# Patient Record
Sex: MALE | Race: WHITE
[De-identification: names, ages, dates, MRNs, and addresses within clinical notes are randomized per-mention and may not be internally consistent; named-entity substitution may affect disease eponyms.]

---

## 2019-12-12 ENCOUNTER — HOSPITAL ENCOUNTER (OUTPATIENT)
Dept: HOSPITAL 75 - CARD | Age: 74
End: 2019-12-12
Attending: PHYSICIAN ASSISTANT
Payer: MEDICARE

## 2019-12-12 DIAGNOSIS — E78.2: ICD-10-CM

## 2019-12-12 DIAGNOSIS — I10: ICD-10-CM

## 2019-12-12 DIAGNOSIS — I65.29: ICD-10-CM

## 2019-12-12 DIAGNOSIS — I27.0: ICD-10-CM

## 2019-12-12 DIAGNOSIS — I51.7: Primary | ICD-10-CM

## 2019-12-12 PROCEDURE — 93306 TTE W/DOPPLER COMPLETE: CPT

## 2019-12-13 ENCOUNTER — HOSPITAL ENCOUNTER (OUTPATIENT)
Dept: HOSPITAL 75 - RAD FS | Age: 74
End: 2019-12-13
Attending: UROLOGY
Payer: MEDICARE

## 2019-12-13 DIAGNOSIS — Z87.442: Primary | ICD-10-CM

## 2019-12-13 PROCEDURE — 74018 RADEX ABDOMEN 1 VIEW: CPT

## 2019-12-13 NOTE — DIAGNOSTIC IMAGING REPORT
INDICATION: History of renal calculi. Follow-up.



COMPARISON: None.



FINDINGS: Two frontal radiographic views of the abdomen were

obtained and demonstrate nondistended loops of small bowel. There

is no large collection of free intraperitoneal air. Multiple

extraosseous rounded calcifications are noted in the right upper

abdominal quadrant, but these appear to reside lateral to the

renal shadow. No unexpected radiopaque foreign bodies are seen.

Osseous structures show no acute abnormalities.



IMPRESSION:

1. Extraosseous calcifications in the right upper abdominal

quadrant, which are felt to be on the basis of debris within the

large or small bowel. Cholelithiasis is also a consideration.

2. No definite renal calculi.

3. Nonobstructive small bowel gas pattern.



Dictated by: 



  Dictated on workstation # [id number]

## 2020-01-13 ENCOUNTER — HOSPITAL ENCOUNTER (OUTPATIENT)
Dept: HOSPITAL 75 - CARD | Age: 75
End: 2020-01-13
Attending: PHYSICIAN ASSISTANT
Payer: MEDICARE

## 2020-01-13 VITALS — DIASTOLIC BLOOD PRESSURE: 70 MMHG | SYSTOLIC BLOOD PRESSURE: 129 MMHG

## 2020-01-13 VITALS — BODY MASS INDEX: 30.1 KG/M2 | HEIGHT: 66.93 IN | WEIGHT: 191.8 LBS

## 2020-01-13 VITALS — DIASTOLIC BLOOD PRESSURE: 71 MMHG | SYSTOLIC BLOOD PRESSURE: 145 MMHG

## 2020-01-13 DIAGNOSIS — I27.0: ICD-10-CM

## 2020-01-13 DIAGNOSIS — I65.23: Primary | ICD-10-CM

## 2020-01-13 DIAGNOSIS — E78.2: ICD-10-CM

## 2020-01-13 PROCEDURE — 78452 HT MUSCLE IMAGE SPECT MULT: CPT

## 2020-01-13 PROCEDURE — 93017 CV STRESS TEST TRACING ONLY: CPT

## 2020-01-13 NOTE — STRESS TEST
DATE OF SERVICE:  01/13/2020



EXERCISE MYOVIEW STRESS TEST REPORT



REFERRING PHYSICIAN:

Dr. Lloyd and Dr. Lama.



Baseline heart rate is 56.  Baseline blood pressure 150/77.  Baseline EKG is

sinus rhythm with no ischemic changes.



In summary, the patient was injected with 10.13 mCi of technetium-99 Myoview and

the resting images were obtained.  Then, the patient started exercising with a

baseline heart rate, blood pressure and EKG mentioned above.  The patient was

able to exercise for a total of 7 minutes 20 seconds on standard Juan protocol.

 With peak exercise level, EKG was showing minimal nondiagnostic changes.  Blood

pressure was 200/79.  During recovery, heart rate and blood pressure returned to

baseline.  EKG returned to baseline.



The resting and stress images were reviewed and compared in the short axis,

horizontal long axis, and vertical long axis views.  Review of the images showed

diaphragmatic attenuation with no significant ischemia or infarction.  SSS is 2,

SDS 2, TID value 1.03.  On the gated images, the left ventricle appeared to be

normal size with normal contractility.  Calculated ejection fraction 63%.



CONCLUSION:

1.  Fair exercise tolerance, a total of 7 minutes 20 seconds on standard Juan

protocol, total of 8.9 METS achieving 93% of maximum expected heart rate.

2.  Severe hypertensive response to exercise with peak blood pressure 200/79

returned to baseline during recovery.

3.  Nondiagnostic EKG changes with exercise returned to baseline during

recovery.

4.  Diaphragmatic attenuation with no significant ischemia or infarction on

SPECT images.

5.  Normal left ventricular size with normal contractility.  Calculated ejection

fraction 63%.





Job ID: [id number]

DocumentID: [id number]

Dictated Date:  01/13/2020 12:33:34

Transcription Date: 01/13/2020 17:01:28

Dictated By: MARGIE SUMMERS MD

## 2020-12-11 ENCOUNTER — HOSPITAL ENCOUNTER (OUTPATIENT)
Dept: HOSPITAL 75 - RAD FS | Age: 75
End: 2020-12-11
Attending: UROLOGY
Payer: MEDICARE

## 2020-12-11 DIAGNOSIS — Z87.442: ICD-10-CM

## 2020-12-11 DIAGNOSIS — N28.89: Primary | ICD-10-CM

## 2020-12-11 PROCEDURE — 74018 RADEX ABDOMEN 1 VIEW: CPT

## 2020-12-11 NOTE — DIAGNOSTIC IMAGING REPORT
INDICATION: HX OF KIDNEY STONES.



TECHNIQUE: 2 supine views of the abdomen 10:12 AM



CORRELATION STUDY: 12/13/2019



FINDINGS:

Multiple calcifications of the right upper quadrant are again

demonstrated. Given the persistent nature, may be reflective of

perhaps gallstones or, less likely, granulomas within the liver.

No definitive calcification in unexpected location of the right

renal silhouette. There are small calcifications over the

inferior pole of the left kidney, 4 mm in size.  Overlying bowel

gas pattern appears nonobstructed.



IMPRESSION:

1. Probable small stone over the inferior pole of the left

kidney.

2. Calcifications in the right upper quadrant are again

demonstrated. Given persistent nature, favors perhaps gallstones

or perhaps hepatic granulomas. One of these does project over the

superior pole of the right kidney.



Dictated by: 



  Dictated on workstation # [id number]

## 2020-12-23 ENCOUNTER — HOSPITAL ENCOUNTER (OUTPATIENT)
Dept: HOSPITAL 75 - RAD | Age: 75
End: 2020-12-23
Attending: FAMILY MEDICINE
Payer: MEDICARE

## 2020-12-23 DIAGNOSIS — N63.10: Primary | ICD-10-CM

## 2020-12-23 PROCEDURE — 76642 ULTRASOUND BREAST LIMITED: CPT

## 2020-12-23 PROCEDURE — 77065 DX MAMMO INCL CAD UNI: CPT

## 2020-12-23 NOTE — DIAGNOSTIC IMAGING REPORT
INDICATION: 

Palpable lump right breast.



COMPARISON:   

02/17/2014.



TECHNIQUE: 

Unilateral right 2D and 3D diagnostic mammography was performed

with CAD.



FINDINGS:

There is a moderate amount of fibroglandular tissue in the

retroareolar right breast, consistent with gynecomastia. No

discrete mass is seen. There is milder gynecomastia in the

retroareolar left breast which was obtained for comparison

purposes. No suspicious microcalcifications are seen.



IMPRESSION: 

Probable gynecomastia in the retroareolar right breast. Even so,

an ultrasound of this area is recommended and will be performed

today.



ACR BI-RADS Category 0: Incomplete. (Needs additional imaging

evaluation).

Result letter will be mailed to the patient.

Note: At least 10% of breast cancer is not imaged by mammography.



Dictated by: 



  Dictated on workstation # [id number]

## 2020-12-23 NOTE — DIAGNOSTIC IMAGING REPORT
INDICATION: 

Right breast lump.



COMPARISON: 

Correlation is made with the diagnostic mammogram from earlier

this same day.



FINDINGS:

Sonographic interrogation of the retroareolar right breast was

performed. There is an ill-defined area of hypoechogenicity, most

consistent with gynecomastia. No discrete mass is seen.



IMPRESSION: 

Findings are most suggestive of gynecomastia.



ACR BI-RADS Category 2: Benign findings.

Result letter will be mailed to the patient.

Note: At least 10% of breast cancer is not imaged by mammography.



Dictated by: 



  Dictated on workstation # [id number]

## 2021-06-11 ENCOUNTER — HOSPITAL ENCOUNTER (OUTPATIENT)
Dept: HOSPITAL 75 - RAD FS | Age: 76
End: 2021-06-11
Attending: UROLOGY
Payer: MEDICARE

## 2021-06-11 DIAGNOSIS — N28.89: Primary | ICD-10-CM

## 2021-06-11 PROCEDURE — 74018 RADEX ABDOMEN 1 VIEW: CPT

## 2021-06-11 NOTE — DIAGNOSTIC IMAGING REPORT
INDICATION: Follow-up of nephrolithiasis.



TECHNIQUE: Single supine view of the abdomen 11:05 AM



CORRELATION STUDY: 12/11/2020.



FINDINGS: Large amount of overlying bowel gas and stool obscures

detail. Multiple calcifications of the right upper quadrant are

again demonstrated. Overall generally stabilized position. These

are somewhat indeterminate but appear to be largely outside the

renal silhouette and could be perhaps gallstones or additional

soft tissue calcifications. The previously noted small

calcification in the inferior pole of left kidney is not

visualized at follow-up. Mild loss of height, particularly the L1

vertebral body. Advanced degenerative changes of bilateral hips.



IMPRESSION:

1. Calcifications of the right upper quadrant overall appear

unchanged. Likely outside the renal silhouette could be

reflective of gallstones or alternative areas of calcification.

2. Previously noted calcifications over the inferior pole of left

kidney not visualized at follow-up.



Dictated by: 



  Dictated on workstation # [id number]

## 2022-02-11 ENCOUNTER — HOSPITAL ENCOUNTER (OUTPATIENT)
Dept: HOSPITAL 75 - RAD FS | Age: 77
End: 2022-02-11
Attending: UROLOGY
Payer: MEDICARE

## 2022-02-11 DIAGNOSIS — N20.0: Primary | ICD-10-CM

## 2022-02-11 PROCEDURE — 74018 RADEX ABDOMEN 1 VIEW: CPT

## 2022-02-11 NOTE — DIAGNOSTIC IMAGING REPORT
INDICATION: Follow up kidney stones.



TIME OF EXAM: 11:20 AM.



COMPARISON: Correlation is made with prior abdominal radiograph

from 06/11/2021.



Right upper quadrant calcifications appear to be similar to prior

exam. The more medially located calcific density appears to

overlie the renal shadow and may represent a renal calculus. The

more laterally located calcifications are indeterminate but could

potentially be in the gallbladder. No left-sided urinary tract

calculi are seen. The bowel gas pattern is unremarkable.



IMPRESSION: Stable KUB when compared to exam from 06/11/2021. 



Dictated by: 



  Dictated on workstation # [id number]

## 2022-04-13 ENCOUNTER — HOSPITAL ENCOUNTER (OUTPATIENT)
Dept: HOSPITAL 75 - SLEEP | Age: 77
LOS: 1 days | Discharge: HOME | End: 2022-04-14
Attending: OTOLARYNGOLOGY
Payer: MEDICARE

## 2022-04-13 DIAGNOSIS — G47.33: Primary | ICD-10-CM

## 2022-04-13 DIAGNOSIS — G47.31: ICD-10-CM

## 2022-04-13 PROCEDURE — 95811 POLYSOM 6/>YRS CPAP 4/> PARM: CPT

## 2022-05-23 ENCOUNTER — HOSPITAL ENCOUNTER (OUTPATIENT)
Dept: HOSPITAL 75 - RT | Age: 77
End: 2022-05-23
Attending: NURSE PRACTITIONER
Payer: MEDICARE

## 2022-05-23 DIAGNOSIS — R05.9: Primary | ICD-10-CM

## 2022-05-23 DIAGNOSIS — R06.02: ICD-10-CM

## 2022-05-23 DIAGNOSIS — R06.00: ICD-10-CM

## 2022-05-23 PROCEDURE — 94726 PLETHYSMOGRAPHY LUNG VOLUMES: CPT

## 2022-05-23 PROCEDURE — 71046 X-RAY EXAM CHEST 2 VIEWS: CPT

## 2022-05-23 PROCEDURE — 94729 DIFFUSING CAPACITY: CPT

## 2022-05-23 PROCEDURE — 94060 EVALUATION OF WHEEZING: CPT

## 2022-05-23 NOTE — DIAGNOSTIC IMAGING REPORT
Indication: Cough and shortness of breath



PA and lateral chest



Heart size and pulmonary vascularity are normal. Lungs are clear.

There are no effusions or pneumothoraces.



IMPRESSION: No acute abnormalities in the chest



Dictated by: 



  Dictated on workstation # [id number]

## 2022-08-05 ENCOUNTER — HOSPITAL ENCOUNTER (OUTPATIENT)
Dept: HOSPITAL 75 - RAD FS | Age: 77
End: 2022-08-05
Attending: OTOLARYNGOLOGY
Payer: MEDICARE

## 2022-08-05 DIAGNOSIS — J32.9: Primary | ICD-10-CM

## 2022-08-05 PROCEDURE — 70486 CT MAXILLOFACIAL W/O DYE: CPT

## 2022-08-05 NOTE — DIAGNOSTIC IMAGING REPORT
PROCEDURE: CT sinuses without contrast



TECHNIQUE: Multiple contiguous axial images were obtained through

the sinuses without the use of intravenous contrast. Coronal and

sagittal reformations were then performed. Auto Exposure Controls

were utilized during the CT exam to meet ALARA standards for

radiation dose reduction.



INDICATION: Chronic sinusitis.



FINDINGS: There is some mucosal thickening of the frontal sinus.

There is opacification of multiple bilateral ethmoid air cells.

Minimal mucosal disease of the sphenoid sinus is seen. Left

maxillary sinus is hypoplastic. There is some minimal mucosal

thickening of bilateral maxillary sinuses. No air-fluid levels

are seen. Mastoids are well aerated. There is thickening in the

ostiomeatal complexes bilaterally. Nasal septum is midline. There

is a lorenzo bullosa on the right.



IMPRESSION: Paranasal sinus mucosal disease. No air-fluid levels

are identified.



Dictated by: 



  Dictated on workstation # [id number]

## 2022-11-25 ENCOUNTER — HOSPITAL ENCOUNTER (OUTPATIENT)
Dept: HOSPITAL 75 - LAB FS | Age: 77
End: 2022-11-25
Attending: FAMILY MEDICINE
Payer: MEDICARE

## 2022-11-25 DIAGNOSIS — K80.20: ICD-10-CM

## 2022-11-25 DIAGNOSIS — I25.10: Primary | ICD-10-CM

## 2022-11-25 DIAGNOSIS — R05.3: ICD-10-CM

## 2022-11-25 DIAGNOSIS — N62: ICD-10-CM

## 2022-11-25 LAB
BUN/CREAT SERPL: 7
CREAT SERPL-MCNC: 0.97 MG/DL (ref 0.6–1.3)
GFR SERPLBLD BASED ON 1.73 SQ M-ARVRAT: 80 ML/MIN

## 2022-11-25 PROCEDURE — 71260 CT THORAX DX C+: CPT

## 2022-11-25 PROCEDURE — 84520 ASSAY OF UREA NITROGEN: CPT

## 2022-11-25 PROCEDURE — 82565 ASSAY OF CREATININE: CPT

## 2022-11-25 PROCEDURE — 36415 COLL VENOUS BLD VENIPUNCTURE: CPT

## 2022-11-25 NOTE — DIAGNOSTIC IMAGING REPORT
PROCEDURE: CT chest with contrast only.



TECHNIQUE: Multiple contiguous axial images were obtained through

the chest after administration of intravenous contrast. Auto

Exposure Controls were utilized during the CT exam to meet ALARA

standards for radiation dose reduction. 



INDICATION: Chronic cough. 



COMPARISON: Chest radiograph 05/23/2022.



FINDINGS: Mild biapical scarring. Lungs are otherwise clear. No

pleural effusion or pneumothorax. No endobronchial lesions. No

pleural effusion or pneumothorax. Normal heart size. No

pericardial effusion. No lymphadenopathy. Advanced coronary

artery calcifications. Chronic left clavicle fracture.

Compression fracture of T12 resulting in approximately 50% height

loss is stable compared to 05/23/2022. Gynecomastia on the right.

Cholelithiasis without secondary findings of cholecystitis.

Nonobstructing renal stones in the partially visualized kidneys

bilaterally.



IMPRESSION: 

1. No acute CT findings in the chest. Mild biapical scarring.

2. Cholelithiasis without cholecystitis.

3. Advanced coronary artery calcifications. 

4. Chronic fractures of the left clavicle and T12. No acute

osseous findings.

5. Gynecomastia.



Dictated by: 



  Dictated on workstation # [id number]

## 2023-02-16 ENCOUNTER — HOSPITAL ENCOUNTER (OUTPATIENT)
Dept: HOSPITAL 75 - LAB FS | Age: 78
End: 2023-02-16
Attending: OTOLARYNGOLOGY
Payer: MEDICARE

## 2023-02-16 DIAGNOSIS — E03.9: Primary | ICD-10-CM

## 2023-02-16 PROCEDURE — 36415 COLL VENOUS BLD VENIPUNCTURE: CPT

## 2023-02-16 PROCEDURE — 84443 ASSAY THYROID STIM HORMONE: CPT

## 2023-03-10 ENCOUNTER — HOSPITAL ENCOUNTER (OUTPATIENT)
Dept: HOSPITAL 75 - CARD | Age: 78
End: 2023-03-10
Attending: FAMILY MEDICINE
Payer: MEDICARE

## 2023-03-10 DIAGNOSIS — I10: Primary | ICD-10-CM

## 2023-03-10 DIAGNOSIS — I25.10: ICD-10-CM

## 2023-03-10 PROCEDURE — 93306 TTE W/DOPPLER COMPLETE: CPT

## 2023-04-27 ENCOUNTER — HOSPITAL ENCOUNTER (EMERGENCY)
Dept: HOSPITAL 75 - ER FS | Age: 78
Discharge: HOME | End: 2023-04-27
Payer: MEDICARE

## 2023-04-27 VITALS — BODY MASS INDEX: 28.55 KG/M2 | WEIGHT: 181.88 LBS | HEIGHT: 66.93 IN

## 2023-04-27 VITALS — SYSTOLIC BLOOD PRESSURE: 110 MMHG | DIASTOLIC BLOOD PRESSURE: 69 MMHG

## 2023-04-27 DIAGNOSIS — Z20.822: ICD-10-CM

## 2023-04-27 DIAGNOSIS — K80.10: Primary | ICD-10-CM

## 2023-04-27 DIAGNOSIS — I10: ICD-10-CM

## 2023-04-27 LAB
ALBUMIN SERPL-MCNC: 4.1 GM/DL (ref 3.2–4.5)
ALP SERPL-CCNC: 124 U/L (ref 40–136)
ALT SERPL-CCNC: 13 U/L (ref 0–55)
APTT PPP: YELLOW S
BACTERIA #/AREA URNS HPF: NEGATIVE /HPF
BASOPHILS # BLD AUTO: 0 10^3/UL (ref 0–0.1)
BASOPHILS NFR BLD AUTO: 0 % (ref 0–10)
BASOPHILS NFR BLD MANUAL: 0 %
BILIRUB SERPL-MCNC: 0.4 MG/DL (ref 0.1–1)
BILIRUB UR QL STRIP: NEGATIVE
BUN/CREAT SERPL: 8
CALCIUM SERPL-MCNC: 9 MG/DL (ref 8.5–10.1)
CHLORIDE SERPL-SCNC: 100 MMOL/L (ref 98–107)
CO2 SERPL-SCNC: 24 MMOL/L (ref 21–32)
CREAT SERPL-MCNC: 0.98 MG/DL (ref 0.6–1.3)
EOSINOPHIL # BLD AUTO: 0.4 10^3/UL (ref 0–0.3)
EOSINOPHIL NFR BLD AUTO: 2 % (ref 0–10)
EOSINOPHIL NFR BLD MANUAL: 3 %
FIBRINOGEN PPP-MCNC: CLEAR MG/DL
GFR SERPLBLD BASED ON 1.73 SQ M-ARVRAT: 79 ML/MIN
GLUCOSE SERPL-MCNC: 135 MG/DL (ref 70–105)
GLUCOSE UR STRIP-MCNC: NEGATIVE MG/DL
HCT VFR BLD CALC: 39 % (ref 40–54)
HGB BLD-MCNC: 13.7 G/DL (ref 13.3–17.7)
KETONES UR QL STRIP: NEGATIVE
LEUKOCYTE ESTERASE UR QL STRIP: NEGATIVE
LIPASE SERPL-CCNC: 18 U/L (ref 8–78)
LYMPHOCYTES # BLD AUTO: 1.7 10^3/UL (ref 1–4)
LYMPHOCYTES NFR BLD AUTO: 10 % (ref 12–44)
MANUAL DIFFERENTIAL PERFORMED BLD QL: YES
MCH RBC QN AUTO: 33 PG (ref 25–34)
MCHC RBC AUTO-ENTMCNC: 35 G/DL (ref 32–36)
MCV RBC AUTO: 94 FL (ref 80–99)
MONOCYTES # BLD AUTO: 0.5 10^3/UL (ref 0–1)
MONOCYTES NFR BLD AUTO: 3 % (ref 0–12)
MONOCYTES NFR BLD: 3 %
NEUTROPHILS # BLD AUTO: 13.2 10^3/UL (ref 1.8–7.8)
NEUTROPHILS NFR BLD AUTO: 84 % (ref 42–75)
NEUTS BAND NFR BLD MANUAL: 75 %
NEUTS BAND NFR BLD: 2 %
NITRITE UR QL STRIP: NEGATIVE
PH UR STRIP: 6.5 [PH] (ref 5–9)
PLATELET # BLD: 253 10^3/UL (ref 130–400)
PMV BLD AUTO: 8.5 FL (ref 9–12.2)
POTASSIUM SERPL-SCNC: 4 MMOL/L (ref 3.6–5)
PROT SERPL-MCNC: 7.1 GM/DL (ref 6.4–8.2)
PROT UR QL STRIP: NEGATIVE
RBC #/AREA URNS HPF: (no result) /HPF
SODIUM SERPL-SCNC: 134 MMOL/L (ref 135–145)
SP GR UR STRIP: 1.02 (ref 1.02–1.02)
SQUAMOUS #/AREA URNS HPF: (no result) /HPF
VARIANT LYMPHS NFR BLD MANUAL: 17 %
WBC # BLD AUTO: 15.8 10^3/UL (ref 4.3–11)
WBC #/AREA URNS HPF: (no result) /HPF

## 2023-04-27 PROCEDURE — 80053 COMPREHEN METABOLIC PANEL: CPT

## 2023-04-27 PROCEDURE — 74177 CT ABD & PELVIS W/CONTRAST: CPT

## 2023-04-27 PROCEDURE — 85007 BL SMEAR W/DIFF WBC COUNT: CPT

## 2023-04-27 PROCEDURE — 81000 URINALYSIS NONAUTO W/SCOPE: CPT

## 2023-04-27 PROCEDURE — 36415 COLL VENOUS BLD VENIPUNCTURE: CPT

## 2023-04-27 PROCEDURE — 83690 ASSAY OF LIPASE: CPT

## 2023-04-27 PROCEDURE — 87636 SARSCOV2 & INF A&B AMP PRB: CPT

## 2023-04-27 PROCEDURE — 85027 COMPLETE CBC AUTOMATED: CPT

## 2023-04-27 RX ADMIN — Medication PRN ML: at 21:42

## 2023-04-27 RX ADMIN — Medication PRN ML: at 21:43

## 2023-04-27 NOTE — ED ABDOMINAL PAIN
General


Chief Complaint:  Abdominal/GI Problems


Stated Complaint:  CONGESTION,ABD PAIN,HIGH BP


Source of Information:  Patient





History of Present Illness


Date Seen by Provider:  2023


Time Seen by Provider:  20:31


Initial Comments


77-year-old male presenting with complaints of abdominal discomfort that started

at 1400 today.  He denies doing anything to cause the pain to start.  He cannot 

be more specific about what the pain is when asked if it was sharp cramping dull

aching pressure or to describe what the pain was he started describing that he 

area of his abdomen concerning but again would never give any details about the 

type of pain.  Even with prompting he still would never answer the type of pain.

 He was asked if he had pain like this before and he said never for this long 

but then asked if he had the pain in the past but it usually went away he said 

no.  He denies having headache or change in his vision, no chest pain no 

shortness of breath.  He states that the pain is in the middle of his belly and 

does not radiate into his chest or into his back.  He states that it is 

approximately a 5 inch diameter.  He has had some mild nausea but no vomiting.  

He stated eating and drinking did not make the pain any different.  Movement and

palpation does not make any difference on the pain.  He denies having any pain 

or burning with urination.  He has no complaints of diarrhea. He denies fever or

chills.


Timing/Duration:  4-6 Hours


Severity/Quality:  Moderate, Other (patient refuses to describe the pain and 

just says its a "discomfort")


Location:  Epigastric, Periumbilical


Radiation:  No Radiation


Activities at Onset:  None


Associated Symptoms:  Back Pain (chronic back pain); No Chest Pain, No 

Diaphoresis, No Fever/Chills, No Fatigue, No Headache, No Heartburn, No Rash, No

Shortness of Air, No Swelling/Mass in Abdomen, No Syncope, No Weakness





Allergies and Home Medications


Allergies


Coded Allergies:  


     naproxen (Unverified  Allergy, Unknown, 20)





Patient Home Medication List


Home Medication List Reviewed:  Yes


Aspirin (Barron Aspirin) 81 Mg Tablet.dr 81 MG PO DAILY, (Reported)


   Entered as Reported by: BRITT SOTO on 13 1244


Citalopram Hydrobromide (Celexa) 40 Mg Tablet, 1 EACH PO DAILY, (Reported)


   Entered as Reported by: BRITT SOTO on 13 124


Cyanocobalamin (Vitamin B12) 1,000 Mcg Tablet.sa, 1,000 MCG PO, (Reported)


   Entered as Reported by: BRITT SOTO on 13


Multivitamin (Multi-Vitamin Daily) 1 Each Tablet, 1 EACH PO, (Reported)


   Entered as Reported by: BRITT SOTO on 13 124


Olmesartan Medoxomil (Benicar) 5 Mg Tablet, 1 EACH PO 2 tabs daily, (Reported)


   Entered as Reported by: BRITT SOTO on 13 124


Omeprazole (Omeprazole) 40 Mg Capsule.dr, 40 MG PO 2 tabs daily, (Reported)


   Entered as Reported by: BRITT SOTO on 13


Pravastatin Sodium (Pravachol) 40 Mg Tablet, 40 MG PO DAILY, (Reported)


   Entered as Reported by: BRITT SOTO on 13


Vitamin E Acid Succinate (Vitamin E) 100 Unit Tablet, 100 UNIT PO, (Reported)


   Entered as Reported by: BRITT SOTO on 13





Review of Systems


Review of Systems


Constitutional:  No chills, No fever


EENTM:  No Symptoms Reported


Respiratory:  No Symptoms Reported


Cardiovascular:  No Symptoms Reported


Gastrointestinal:  See HPI


Genitourinary:  Denies Flank Pain, Denies Pain


Musculoskeletal:  see HPI


Skin:  No change in color


Psychiatric/Neurological:  No Symptoms Reported





Past Medical-Social-Family Hx


Past Medical History


Surgery/Hospitalization HX:  


Hypertension





Physical Exam


Vital Signs





Vital Signs - First Documented








 23





 20:37


 


Temp 36.2


 


Pulse 54


 


Resp 18


 


B/P (MAP) 197/74 (115)


 


Pulse Ox 95


 


O2 Delivery Room Air





Capillary Refill :


Height/Weight/BMI


Height: '"


Weight: lbs. oz. kg; 30.10 BMI


Method:


General Appearance:  WD/WN, no apparent distress


Respiratory:  chest non-tender, lungs clear, normal breath sounds, no 

respiratory distress, no accessory muscle use


Cardiovascular:  normal peripheral pulses, regular rate, rhythm


Gastrointestinal:  normal bowel sounds, non tender, soft, no pulsatile mass


Extremities:  normal range of motion, normal capillary refill


Neurologic/Psychiatric:  alert


Skin:  normal color, warm/dry





Progress/Results/Core Measures


Results/Orders


Lab Results





Laboratory Tests








Test


 23


20:28 23


20:38 23


21:20 Range/Units


 


 


Urine Color YELLOW     


 


Urine Clarity CLEAR     


 


Urine pH 6.5    5-9  


 


Urine Specific Gravity 1.020    1.016-1.022  


 


Urine Protein NEGATIVE    NEGATIVE  


 


Urine Glucose (UA) NEGATIVE    NEGATIVE  


 


Urine Ketones NEGATIVE    NEGATIVE  


 


Urine Nitrite NEGATIVE    NEGATIVE  


 


Urine Bilirubin NEGATIVE    NEGATIVE  


 


Urine Urobilinogen 0.2    < = 1.0  MG/DL


 


Urine Leukocyte Esterase NEGATIVE    NEGATIVE  


 


Urine RBC (Auto) NEGATIVE    NEGATIVE  


 


Urine RBC NONE     /HPF


 


Urine WBC RARE     /HPF


 


Urine Squamous Epithelial


Cells RARE 


 


 


  /HPF





 


Urine Crystals NONE     /LPF


 


Urine Bacteria NEGATIVE     /HPF


 


Urine Casts NONE     /LPF


 


Urine Mucus NEGATIVE     /LPF


 


Urine Culture Indicated NO     


 


White Blood Count


 


 15.8 H


 


 4.3-11.0


10^3/uL


 


Red Blood Count


 


 4.16 L


 


 4.30-5.52


10^6/uL


 


Hemoglobin  13.7   13.3-17.7  g/dL


 


Hematocrit  39 L  40-54  %


 


Mean Corpuscular Volume  94   80-99  fL


 


Mean Corpuscular Hemoglobin  33   25-34  pg


 


Mean Corpuscular Hemoglobin


Concent 


 35 


 


 32-36  g/dL





 


Red Cell Distribution Width  12.1   10.0-14.5  %


 


Platelet Count


 


 253 


 


 130-400


10^3/uL


 


Mean Platelet Volume  8.5 L  9.0-12.2  fL


 


Immature Granulocyte % (Auto)  0    %


 


Neutrophils (%) (Auto)  84 H  42-75  %


 


Lymphocytes (%) (Auto)  10 L  12-44  %


 


Monocytes (%) (Auto)  3   0-12  %


 


Eosinophils (%) (Auto)  2   0-10  %


 


Basophils (%) (Auto)  0   0-10  %


 


Neutrophils # (Auto)


 


 13.2 H


 


 1.8-7.8


10^3/uL


 


Lymphocytes # (Auto)


 


 1.7 


 


 1.0-4.0


10^3/uL


 


Monocytes # (Auto)


 


 0.5 


 


 0.0-1.0


10^3/uL


 


Eosinophils # (Auto)


 


 0.4 H


 


 0.0-0.3


10^3/uL


 


Basophils # (Auto)


 


 0.0 


 


 0.0-0.1


10^3/uL


 


Immature Granulocyte # (Auto)


 


 0.1 


 


 0.0-0.1


10^3/uL


 


Neutrophils % (Manual)  75    %


 


Lymphocytes % (Manual)  17    %


 


Monocytes % (Manual)  3    %


 


Eosinophils % (Manual)  3    %


 


Basophils % (Manual)  0    %


 


Band Neutrophils  2    %


 


Sodium Level  134 L  135-145  MMOL/L


 


Potassium Level  4.0   3.6-5.0  MMOL/L


 


Chloride Level  100     MMOL/L


 


Carbon Dioxide Level  24   21-32  MMOL/L


 


Anion Gap  10   5-14  MMOL/L


 


Blood Urea Nitrogen  8   7-18  MG/DL


 


Creatinine


 


 0.98 


 


 0.60-1.30


MG/DL


 


Estimat Glomerular Filtration


Rate 


 79 


 


  





 


BUN/Creatinine Ratio  8    


 


Glucose Level  135 H    MG/DL


 


Calcium Level  9.0   8.5-10.1  MG/DL


 


Corrected Calcium  8.9   8.5-10.1  MG/DL


 


Total Bilirubin  0.4   0.1-1.0  MG/DL


 


Aspartate Amino Transf


(AST/SGOT) 


 20 


 


 5-34  U/L





 


Alanine Aminotransferase


(ALT/SGPT) 


 13 


 


 0-55  U/L





 


Alkaline Phosphatase  124     U/L


 


Total Protein  7.1   6.4-8.2  GM/DL


 


Albumin  4.1   3.2-4.5  GM/DL


 


Lipase  18   8-78  U/L


 


Influenza Type A (RT-PCR)   Not Detected  Not Detecte  


 


Influenza Type B (RT-PCR)   Not Detected  Not Detecte  


 


SARS-CoV-2 RNA (RT-PCR)   Not Detected  Not Detecte  








My Orders





Orders - AMRITA FRIAS MD


Comprehensive Metabolic Panel (23 20:29)


Lipase (23 20:29)


Ua Culture If Indicated (23 20:29)


Ed Iv/Invasive Line Start (23 20:29)


Cbc With Automated Diff (23 20:29)


Covid 19 Inhouse Test (23 20:29)


Influenza A And B By Pcr (23 20:29)


Ct Abdomen/Pelvis W (23 20:44)


Hydralazine Injection (Apresoline Inject (23 20:44)


Pantoprazole Injection (Protonix Injecti (23 20:44)


Ketorolac Injection (Toradol Injection) (23 20:44)


Manual Differential (23 20:38)


Iohexol Injection (Omnipaque 350 Mg/Ml 1 (23 21:15)


Received Contrast (Hold Metformin- Contr (23 21:15)


Sodium Chloride Flush (Catheter Flush Sy (23 21:15)


Ns (Ivpb) (Sodium Chloride 0.9% Ivpb Bag (23 21:15)





Medications Given in ED





Current Medications








 Medications  Dose


 Ordered  Sig/Kathy


 Route  Start Time


 Stop Time Status Last Admin


Dose Admin


 


 Iohexol  80 ml  ONCE  ONCE


 IV  23 21:15


 23 21:16 DC 23 21:42


80 ML


 


 Sodium Chloride  10 ml  AS NEEDED  PRN


 IV  23 21:15


 23 22:36 DC 23 21:43


10 ML


 


 Sodium Chloride  100 ml  ONCE  ONCE


 IV  23 21:15


 23 21:16 DC 23 21:42


100 ML








Vital Signs/I&O











 23





 20:37 22:25


 


Temp 36.2 36.8


 


Pulse 54 79


 


Resp 18 20


 


B/P (MAP) 197/74 (115) 110/69


 


Pulse Ox 95 96


 


O2 Delivery Room Air Room Air











Progress


Progress Note #1:  


Progress Note


Potential diagnosis of gastritis, constipation, colitis, diverticulitis, 

ischemic colitis, peptic ulcer disease.





Obtain peripheral IV access and send labs for complete blood count, 

comprehensive metabolic profile, lipase, urinalysis.  Obtain a CT scan of the 

abdomen and pelvis with IV contrast looking for signs of ischemic bowel, 

diverticulitis, colitis, constipation.  Normal saline 1 L IV fluid bolus for 

hydration, Protonix 40 mg IV for possible gastritis, Toradol 15 mg IV for 

abdominal pain and inflammation.


Progress Note #2:  


Progress Note


 complete blood count shows elevated white blood cell count of 15.8 

thousand.  Hemoglobin was not showing anemia as it was at 13.7.  There was a 

slight left shift with 75% neutrophils, 17 lymphocytes, 2% bands.  Comprehensive

metabolic profile did not show any acute significant abnormality to account for 

his abdominal discomfort.  His creatinine was normal at 0.98.  Glucose was 

slightly elevated at 135.  Lipase was normal at 18.  Liver enzymes were not 

elevated.  Urinalysis showed specific gravity of 1.020.  There is no nitrites, 

leukocyte esterase, bacteria to indicate UTI.  After hydralazine blood pressure 

was down to 150/73.


Progress Note #3:  


   Time:  22:04


Progress Note


220 CT scanning of the abdomen and pelvis read by the radiologist shows 

findings concerning for acute cholecystitis with a 7 mm stone in the cystic 

duct.  There is gallbladder wall thickening and mild pericholecystic fluid.  His

liver enzymes are not elevated and his pain is now down to a 0.  


I called and spoke with the on-call surgeon, Dr. Hart.  After reviewing the 

patient's presentation and his elevated white blood cell count with normal LFTs 

and findings on CT concerning for a gallstone in the cystic duct and some 

inflammation of the gallbladder he advised if the patient was having continued 

pain he could be admitted for pain control and plan on surgery tomorrow.  

Otherwise he could go home and have nothing to eat or drink after midnight and 

then call the office in the morning and he could be seen or they could arrange 

for him to have surgery tomorrow with he was still having pain and concerns 

otherwise it could be scheduled next week sometime or when it was convenient for

him.  If his pain worsens or return nature he has uncontrolled nausea and 

vomiting then he should be seen again as he may need to stay overnight for 

having pain control and surgery.





When reviewing options with the patient he stated since his pain was gone now he

would like to go home.  They will call the office in the morning and see about 

follow-up and when to get surgery done.





Diagnostic Imaging





   Diagonstic Imaging:  CT


   Plain Films/CT/US/NM/MRI:  abdomen, pelvis


Comments


                 [facility] [place], [place]





NAME:   ERNIE GUERRERO JOSE


[facility] REC#:   [id number]


ACCOUNT#:   [id number]


PT STATUS:   REG ER


:   1945


PHYSICIAN:   AMRITA FRIAS MD


ADMIT DATE:   23/ER FS


                                  ***Signed***


Date of Exam:23





CT ABDOMEN/PELVIS W








PROCEDURE: CT abdomen and pelvis with contrast.





TECHNIQUE: Multiple contiguous axial images were obtained through


the abdomen and pelvis after administration of intravenous


contrast. Auto Exposure Controls were utilized during the CT exam


to meet ALARA standards for radiation dose reduction. All CT


scans use one or more of the following dose optimizing


techniques: automated exposure control, MA and/or KvP adjustment


based on patient size and exam type or iterative reconstruction.





INDICATION: Epigastric and periumbilical pain.





FINDINGS: There is mild linear atelectasis and/or scarring in the


lung bases. No focal hepatic, pancreatic, adrenal gland, splenic


or renal abnormality is identified. There are multiple stones


present within the gallbladder including in the neck. Additional


calculus of approximately 0.7 cm in diameter appears to reside


within the cystic duct. There is gallbladder wall thickening and


pericholecystic fluid. Mild intrahepatic biliary ductal


dilatation is also suspected. Stomach appears mildly thickened


although this could be due to incomplete distention. There is no


evidence of free fluid within the abdomen or pelvis. Numerous


sigmoid diverticula are present without associated inflammation.


Bladder is incompletely distended which limits evaluation. There


are surgical findings at the prostate gland. There is mild


compression deformity of T12 vertebral body without hematoma


suggesting its a chronic finding.





IMPRESSION:


1. Findings are suggestive of acute cholecystitis likely due to


cystic duct obstruction. There may be secondary biliary ductal


dilatation as well. This could be related to previous stone


passage.


2. There is also gastric mural thickening suggestive of possible


gastritis which could be in the setting of peptic ulcer disease. 





Dictated by: 





  Dictated on workstation # [id number]








Dict:   23


Trans:   23


[facility] [id number]





Interpreted by:     YESI GUTIERREZ MD


Electronically signed by: YESI GUTIERREZ MD 23


   Reviewed:  Reviewed by Me





Departure


Impression





   Primary Impression:  


   Cholecystitis with cholelithiasis


   Qualified Codes:  K80.42 - Calculus of bile duct with acute cholecystitis 

   without obstruction


   Additional Impressions:  


   Periumbilical abdominal pain


   Elevated blood pressure reading with diagnosis of hypertension


Disposition:  01 HOME, SELF-CARE


Condition:  Improved





Departure-Patient Inst.


Decision time for Depature:  22:17


Referrals:  


NOEMI HART PANKAJ K MD (PCP/Family)


Primary Care Physician


Patient Instructions:  Gallstones ED, High Blood Pressure ED, Gallbladder Diet





Add. Discharge Instructions:  


Nothing to eat or drink after midnight and call Dr. Hart in the morning to see

when he wants to have the gallbladder removed. 


If you are still having pain or your symptoms are worsening then they could do 

surgery tomorrow, otherwise he can schedule surgery for you as an outpatient and

have you follow a low fat bland diet until you have surgery. 





All discharge instructions reviewed with patient and/or family. Voiced 

understanding.











AMRITA FRIAS MD               2023 21:00

## 2023-04-27 NOTE — DIAGNOSTIC IMAGING REPORT
PROCEDURE: CT abdomen and pelvis with contrast.



TECHNIQUE: Multiple contiguous axial images were obtained through

the abdomen and pelvis after administration of intravenous

contrast. Auto Exposure Controls were utilized during the CT exam

to meet ALARA standards for radiation dose reduction. All CT

scans use one or more of the following dose optimizing

techniques: automated exposure control, MA and/or KvP adjustment

based on patient size and exam type or iterative reconstruction.



INDICATION: Epigastric and periumbilical pain.



FINDINGS: There is mild linear atelectasis and/or scarring in the

lung bases. No focal hepatic, pancreatic, adrenal gland, splenic

or renal abnormality is identified. There are multiple stones

present within the gallbladder including in the neck. Additional

calculus of approximately 0.7 cm in diameter appears to reside

within the cystic duct. There is gallbladder wall thickening and

pericholecystic fluid. Mild intrahepatic biliary ductal

dilatation is also suspected. Stomach appears mildly thickened

although this could be due to incomplete distention. There is no

evidence of free fluid within the abdomen or pelvis. Numerous

sigmoid diverticula are present without associated inflammation.

Bladder is incompletely distended which limits evaluation. There

are surgical findings at the prostate gland. There is mild

compression deformity of T12 vertebral body without hematoma

suggesting its a chronic finding.



IMPRESSION:

1. Findings are suggestive of acute cholecystitis likely due to

cystic duct obstruction. There may be secondary biliary ductal

dilatation as well. This could be related to previous stone

passage.

2. There is also gastric mural thickening suggestive of possible

gastritis which could be in the setting of peptic ulcer disease. 



Dictated by: 



  Dictated on workstation # [id number]

## 2023-04-28 ENCOUNTER — HOSPITAL ENCOUNTER (OUTPATIENT)
Dept: HOSPITAL 75 - SDC | Age: 78
End: 2023-04-28
Attending: SURGERY
Payer: MEDICARE

## 2023-04-28 VITALS — SYSTOLIC BLOOD PRESSURE: 153 MMHG | DIASTOLIC BLOOD PRESSURE: 79 MMHG

## 2023-04-28 VITALS — BODY MASS INDEX: 28.48 KG/M2 | WEIGHT: 181.44 LBS | HEIGHT: 67.01 IN

## 2023-04-28 VITALS — SYSTOLIC BLOOD PRESSURE: 183 MMHG | DIASTOLIC BLOOD PRESSURE: 84 MMHG

## 2023-04-28 VITALS — DIASTOLIC BLOOD PRESSURE: 86 MMHG | SYSTOLIC BLOOD PRESSURE: 165 MMHG

## 2023-04-28 VITALS — SYSTOLIC BLOOD PRESSURE: 159 MMHG | DIASTOLIC BLOOD PRESSURE: 81 MMHG

## 2023-04-28 VITALS — SYSTOLIC BLOOD PRESSURE: 165 MMHG | DIASTOLIC BLOOD PRESSURE: 86 MMHG

## 2023-04-28 VITALS — SYSTOLIC BLOOD PRESSURE: 170 MMHG | DIASTOLIC BLOOD PRESSURE: 81 MMHG

## 2023-04-28 VITALS — SYSTOLIC BLOOD PRESSURE: 144 MMHG | DIASTOLIC BLOOD PRESSURE: 72 MMHG

## 2023-04-28 VITALS — SYSTOLIC BLOOD PRESSURE: 171 MMHG | DIASTOLIC BLOOD PRESSURE: 89 MMHG

## 2023-04-28 VITALS — SYSTOLIC BLOOD PRESSURE: 185 MMHG | DIASTOLIC BLOOD PRESSURE: 81 MMHG

## 2023-04-28 VITALS — SYSTOLIC BLOOD PRESSURE: 178 MMHG | DIASTOLIC BLOOD PRESSURE: 90 MMHG

## 2023-04-28 VITALS — DIASTOLIC BLOOD PRESSURE: 72 MMHG | SYSTOLIC BLOOD PRESSURE: 144 MMHG

## 2023-04-28 VITALS — DIASTOLIC BLOOD PRESSURE: 86 MMHG | SYSTOLIC BLOOD PRESSURE: 167 MMHG

## 2023-04-28 DIAGNOSIS — E66.9: ICD-10-CM

## 2023-04-28 DIAGNOSIS — Z87.891: ICD-10-CM

## 2023-04-28 DIAGNOSIS — G47.33: ICD-10-CM

## 2023-04-28 DIAGNOSIS — K80.12: Primary | ICD-10-CM

## 2023-04-28 DIAGNOSIS — Z99.81: ICD-10-CM

## 2023-04-28 PROCEDURE — 87081 CULTURE SCREEN ONLY: CPT

## 2023-04-28 PROCEDURE — 76000 FLUOROSCOPY <1 HR PHYS/QHP: CPT

## 2023-04-28 PROCEDURE — 88304 TISSUE EXAM BY PATHOLOGIST: CPT

## 2023-04-28 RX ADMIN — SODIUM CHLORIDE, SODIUM LACTATE, POTASSIUM CHLORIDE, AND CALCIUM CHLORIDE PRN MLS/HR: 600; 310; 30; 20 INJECTION, SOLUTION INTRAVENOUS at 13:16

## 2023-04-28 RX ADMIN — ONDANSETRON PRN MG: 2 INJECTION, SOLUTION INTRAMUSCULAR; INTRAVENOUS at 12:47

## 2023-04-28 RX ADMIN — IOHEXOL ONE ML: 300 INJECTION, SOLUTION INTRAVENOUS at 11:49

## 2023-04-28 RX ADMIN — IOHEXOL ONE ML: 300 INJECTION, SOLUTION INTRAVENOUS at 10:30

## 2023-04-28 RX ADMIN — ONDANSETRON PRN MG: 2 INJECTION, SOLUTION INTRAMUSCULAR; INTRAVENOUS at 13:06

## 2023-04-28 RX ADMIN — SODIUM CHLORIDE, SODIUM LACTATE, POTASSIUM CHLORIDE, AND CALCIUM CHLORIDE PRN MLS/HR: 600; 310; 30; 20 INJECTION, SOLUTION INTRAVENOUS at 10:16

## 2023-04-28 NOTE — ANESTHESIA-GENERAL POST-OP
General


Patient Condition


Mental Status/LOC:  Same as Preop


Cardiovascular:  Satisfactory


Nausea/Vomiting:  Absent


Respiratory:  Satisfactory


Pain:  Controlled


Complications:  Absent





Post Op Complications


Complications


None





Follow Up Care/Instructions


Patient Instructions


None needed.





Anesthesia/Patient Condition


Patient Condition


Patient is doing well, no complaints, stable vital signs, no apparent adverse 

anesthesia problems.   


No complications reported per nursing.











JUAN C WILKINS CRNA          Apr 28, 2023 12:29

## 2023-04-28 NOTE — DIAGNOSTIC IMAGING REPORT
INDICATION: Cholecystectomy.



Operative cholangiogram performed in the routine fashion. 20.6

seconds of fluoroscopy time was used. 111 images were obtained.



FINDINGS: Contrast injection demonstrates injection of the cystic

duct stump. There is some extravasation at the injection site.

There are no filling defects in the common bile duct; contrast

passes to the duodenum without obstruction.



IMPRESSION:



Unremarkable operative cholangiogram.



Dictated by: 



  Dictated on workstation # [id number]

## 2023-04-28 NOTE — PROGRESS NOTE-POST OPERATIVE
Post-Operative Progess Note


Surgeon (s)/Assistant (s)


Surgeon


NOEMI HART DO


Assistant:  Janeth





Pre-Operative Diagnosis


CHOLELITHIASIS





Post-Operative Diagnosis





Acute Cholecystitis/Cholelithiasis  with Cystic duct obstruction





Procedure & Operative Findings


Date of Procedure


4/28/23


Procedure Performed/Findings


PROCEDURE: Laparoscopic cholecystectomy with intraoperative cholangiogram. 


 


COMPLICATIONS: None. 


 


PROCEDURE:


The patient was taken to the operating suite and was prepped and draped in 


sterile fashion. A surgical pause was performed. Just superior to the umbilicus,




a 12 mm incision was made. Dissection was taken down to the fascia, which was 


then scored and grasped with a Kocher and the abdomen was then entered.  An 


0-Vicryl suture was placed in a figure-of-eight fashion and a Gonzáles trocar was 


placed and secured. Pneumoperitoneum was achieved. A 5mm trochar place in the 


subxyphoid and 2 in the right upper quadrant. The gallbladder was then noted to 


be very edematous and erythematous.  It was grasped at the fundus and taken in


the superior direction.  There was a lot of edema and fat around Crabtree's pouch


which was grasped and taken in the infero-lateral direction. The cystic duct and




cystic artery were then dissected out. Clip was placed on the distal portion of 


the cystic duct which was then partially transected.  Had to then carefully 

tease


out a large stone that was obstructing the cystic duct.  Once I had gotten it


out an arrow catheter was inserted into the duct. The cholangiogram was then 


performed. No filling defects and contrast made its way up the CBD, left and 


right hepatic and into the duodenum.  Catheter was then removed and clips were 


placed on proximal portion of the cystic duct and then the duct was then 

transected. 


Clips were placed along the proximal and distal portion of the cystic artery 

which 


was then transected. I encountered a posterior branch of the cystic artery and 

it


was also clipped.  The upper grasper made a hole in the gallbladder and some 

purulent


bile leaked out. Hook cautery was used to dissect the gallbladder from the 

gallbladder


fossa achieving hemostasis. The gallbladder was placed in an Endobag and removed




through the 12 mm trocar site. The abdomen was then reinspected.  Copious 

amounts of 


irrigation were used to irrigate the abdomen and there were no signs of active 

bleeding.


Hemostasis had been achieved. I looked around and found bilateral indirect 

inguinal


hernias; picture taken. The 12 mm fascial defect was then closed with 0 Vicryl 

suture 


that had been placed in a figure-of-eight fashion. The abdomen was then 

desufflated, the


trocars were removed. The abdomen was then washed and dried. The skin was then 

closed 


using 4-0 Monocryl in a subcuticular fashion. The abdomen was washed and dried 

and Skin 


Affix was place over incisions. Patient tolerated the procedure well without any

complications 


and was taken to the recovery room in stable condition.








Janeth assisted on this case helping to make incisions, close incisions, 

identify anatomy


and hold anatomy out of the way.


Anesthesia Type


GET





Estimated Blood Loss


Estimated blood loss (mL):  scant





Specimens/Packing


Specimens Removed


GB and contents











NOEMI HART DO               Apr 28, 2023 12:27

## 2023-04-28 NOTE — DISCHARGE INST-SURGICAL
Discharge Inst-Surgical


Depart Medication/Instructions


New, Converted or Re-Newed RX:  Transmitted to Pharmacy


Patient Instructions


Follow up Appt:


Make appointment for 1 week. [phone]





Instructions:


No lifting greater than 20 pounds.


No strenuous activity. 


May shower in 24 hours, no tub bath or soaking.


Use incentive spirometer at home as directed.


No Smoking





Skin/Wound Care:


May remove bandages in am.  You need to leave the Dermabond on incision it will 

fall off on it's own. 





Symptoms to Report:


Appetite Changes, Extremity Discoloration, Numbness/Tingling, Swelling 

Increased, Bleeding Excessive, Eyesight Changes, Pain Increased, Urine Color 

Change, Constipation(Persistent), Fever over 101 degree F, Pain/Pressure in 

chest, Urinating Difficulty, Cough Up/Vomit Blood, Heart Beat Irreg/Pounding, 

Pain/Pressure in jaw, Cramps in feet or legs, Lightheadedness, Pain/Pressure in 

shoulder, Diarrhea(Persistent), Memory Changes Suddenly, Questions/Concerns, 

Weight gain consecutive days, Dizziness/Fainting, Nausea/Vomiting, Shortness of 

Breath, Weight gain over 2 pounds








If questions or concerns contact your physician 


Or seek help at emergency department.





Activity


Activity as Tolerated:  Yes


Activity Instructions:  Avoid Stress to Incision


Driving Instructions:  No Driving/Refer to 





Diet


Discharge Diet:  Avoid Fatty Foods, Low Fat/Low Cholesterol


If Any Problems/Questions/Issu:  Contact Your Physician, Go to Emergency Room





Skin/Wound Care


Infection Signs and Symptoms:  Increased Redness, Foul Odor of Wound, Increased 

Drainage, Skin Itchy or Has a Rash, Increased Swelling, Temperature Above 101  F


Wound Care Comment:  


heating pad to shoulder or neck for pain tonight


Bathing Instructions:  Shower


Stitches/Staples/Dermabond Dis:  Dermabond


Ice Pack:  Ice On and Off Site











NOEMI HART DO               Apr 28, 2023 12:33

## 2023-05-15 ENCOUNTER — HOSPITAL ENCOUNTER (INPATIENT)
Dept: HOSPITAL 75 - ER | Age: 78
LOS: 3 days | Discharge: HOME | DRG: 441 | End: 2023-05-18
Attending: SURGERY | Admitting: SURGERY
Payer: MEDICARE

## 2023-05-15 VITALS — DIASTOLIC BLOOD PRESSURE: 68 MMHG | SYSTOLIC BLOOD PRESSURE: 152 MMHG

## 2023-05-15 VITALS — WEIGHT: 162.04 LBS | HEIGHT: 67.99 IN | BODY MASS INDEX: 24.56 KG/M2

## 2023-05-15 VITALS — SYSTOLIC BLOOD PRESSURE: 159 MMHG | DIASTOLIC BLOOD PRESSURE: 70 MMHG

## 2023-05-15 VITALS — SYSTOLIC BLOOD PRESSURE: 154 MMHG | DIASTOLIC BLOOD PRESSURE: 70 MMHG

## 2023-05-15 DIAGNOSIS — R41.0: ICD-10-CM

## 2023-05-15 DIAGNOSIS — E87.1: ICD-10-CM

## 2023-05-15 DIAGNOSIS — E83.42: ICD-10-CM

## 2023-05-15 DIAGNOSIS — Z88.6: ICD-10-CM

## 2023-05-15 DIAGNOSIS — H91.93: ICD-10-CM

## 2023-05-15 DIAGNOSIS — F90.9: ICD-10-CM

## 2023-05-15 DIAGNOSIS — J90: ICD-10-CM

## 2023-05-15 DIAGNOSIS — Z79.899: ICD-10-CM

## 2023-05-15 DIAGNOSIS — J44.9: ICD-10-CM

## 2023-05-15 DIAGNOSIS — K75.0: Primary | ICD-10-CM

## 2023-05-15 DIAGNOSIS — R53.1: ICD-10-CM

## 2023-05-15 DIAGNOSIS — E87.6: ICD-10-CM

## 2023-05-15 DIAGNOSIS — F41.9: ICD-10-CM

## 2023-05-15 DIAGNOSIS — I10: ICD-10-CM

## 2023-05-15 DIAGNOSIS — Z97.4: ICD-10-CM

## 2023-05-15 DIAGNOSIS — F32.A: ICD-10-CM

## 2023-05-15 DIAGNOSIS — J18.9: ICD-10-CM

## 2023-05-15 DIAGNOSIS — G47.30: ICD-10-CM

## 2023-05-15 DIAGNOSIS — Z87.891: ICD-10-CM

## 2023-05-15 DIAGNOSIS — E86.0: ICD-10-CM

## 2023-05-15 DIAGNOSIS — Z79.82: ICD-10-CM

## 2023-05-15 LAB
ALBUMIN SERPL-MCNC: 2.9 GM/DL (ref 3.2–4.5)
ALP SERPL-CCNC: 174 U/L (ref 40–136)
ALT SERPL-CCNC: 49 U/L (ref 0–55)
AMORPH SED URNS QL MICRO: (no result) /LPF
APTT PPP: YELLOW S
BACTERIA #/AREA URNS HPF: NEGATIVE /HPF
BASOPHILS # BLD AUTO: 0 10^3/UL (ref 0–0.1)
BASOPHILS NFR BLD AUTO: 0 % (ref 0–10)
BILIRUB SERPL-MCNC: 0.9 MG/DL (ref 0.1–1)
BILIRUB UR QL STRIP: NEGATIVE
BUN/CREAT SERPL: 18
CALCIUM SERPL-MCNC: 8.6 MG/DL (ref 8.5–10.1)
CHLORIDE SERPL-SCNC: 95 MMOL/L (ref 98–107)
CO2 SERPL-SCNC: 22 MMOL/L (ref 21–32)
CREAT SERPL-MCNC: 0.87 MG/DL (ref 0.6–1.3)
EOSINOPHIL # BLD AUTO: 0 10^3/UL (ref 0–0.3)
EOSINOPHIL NFR BLD AUTO: 0 % (ref 0–10)
FIBRINOGEN PPP-MCNC: CLEAR MG/DL
GFR SERPLBLD BASED ON 1.73 SQ M-ARVRAT: 88 ML/MIN
GLUCOSE SERPL-MCNC: 121 MG/DL (ref 70–105)
GLUCOSE UR STRIP-MCNC: NEGATIVE MG/DL
HCT VFR BLD CALC: 31 % (ref 40–54)
HGB BLD-MCNC: 10.6 G/DL (ref 13.3–17.7)
INR PPP: 1.2 (ref 0.8–1.4)
KETONES UR QL STRIP: NEGATIVE
LEUKOCYTE ESTERASE UR QL STRIP: NEGATIVE
LIPASE SERPL-CCNC: 22 U/L (ref 8–78)
LYMPHOCYTES # BLD AUTO: 0.9 10^3/UL (ref 1–4)
LYMPHOCYTES NFR BLD AUTO: 5 % (ref 12–44)
MAGNESIUM SERPL-MCNC: 1.3 MG/DL (ref 1.6–2.4)
MANUAL DIFFERENTIAL PERFORMED BLD QL: YES
MCH RBC QN AUTO: 33 PG (ref 25–34)
MCHC RBC AUTO-ENTMCNC: 35 G/DL (ref 32–36)
MCV RBC AUTO: 94 FL (ref 80–99)
MONOCYTES # BLD AUTO: 1.2 10^3/UL (ref 0–1)
MONOCYTES NFR BLD AUTO: 7 % (ref 0–12)
MONOCYTES NFR BLD: 3 %
NEUTROPHILS # BLD AUTO: 16.3 10^3/UL (ref 1.8–7.8)
NEUTROPHILS NFR BLD AUTO: 88 % (ref 42–75)
NEUTS BAND NFR BLD MANUAL: 93 %
NEUTS BAND NFR BLD: 1 %
NITRITE UR QL STRIP: NEGATIVE
PH UR STRIP: 6 [PH] (ref 5–9)
PLATELET # BLD: 472 10^3/UL (ref 130–400)
PMV BLD AUTO: 8.2 FL (ref 9–12.2)
POTASSIUM SERPL-SCNC: 3.1 MMOL/L (ref 3.6–5)
PROT SERPL-MCNC: 6.5 GM/DL (ref 6.4–8.2)
PROT UR QL STRIP: (no result)
PROTHROMBIN TIME: 15.2 SEC (ref 12.2–14.7)
RBC #/AREA URNS HPF: (no result) /HPF
RBC MORPH BLD: NORMAL
SODIUM SERPL-SCNC: 129 MMOL/L (ref 135–145)
SP GR UR STRIP: 1.01 (ref 1.02–1.02)
SQUAMOUS #/AREA URNS HPF: (no result) /HPF
VARIANT LYMPHS NFR BLD MANUAL: 3 %
WBC # BLD AUTO: 18.6 10^3/UL (ref 4.3–11)
WBC #/AREA URNS HPF: (no result) /HPF

## 2023-05-15 PROCEDURE — 36415 COLL VENOUS BLD VENIPUNCTURE: CPT

## 2023-05-15 PROCEDURE — 83605 ASSAY OF LACTIC ACID: CPT

## 2023-05-15 PROCEDURE — 77012 CT SCAN FOR NEEDLE BIOPSY: CPT

## 2023-05-15 PROCEDURE — 87205 SMEAR GRAM STAIN: CPT

## 2023-05-15 PROCEDURE — 85610 PROTHROMBIN TIME: CPT

## 2023-05-15 PROCEDURE — 85025 COMPLETE CBC W/AUTO DIFF WBC: CPT

## 2023-05-15 PROCEDURE — 74177 CT ABD & PELVIS W/CONTRAST: CPT

## 2023-05-15 PROCEDURE — 87070 CULTURE OTHR SPECIMN AEROBIC: CPT

## 2023-05-15 PROCEDURE — 85007 BL SMEAR W/DIFF WBC COUNT: CPT

## 2023-05-15 PROCEDURE — 87075 CULTR BACTERIA EXCEPT BLOOD: CPT

## 2023-05-15 PROCEDURE — 85027 COMPLETE CBC AUTOMATED: CPT

## 2023-05-15 PROCEDURE — 87040 BLOOD CULTURE FOR BACTERIA: CPT

## 2023-05-15 PROCEDURE — 83690 ASSAY OF LIPASE: CPT

## 2023-05-15 PROCEDURE — 71045 X-RAY EXAM CHEST 1 VIEW: CPT

## 2023-05-15 PROCEDURE — 81000 URINALYSIS NONAUTO W/SCOPE: CPT

## 2023-05-15 PROCEDURE — 71046 X-RAY EXAM CHEST 2 VIEWS: CPT

## 2023-05-15 PROCEDURE — 80053 COMPREHEN METABOLIC PANEL: CPT

## 2023-05-15 PROCEDURE — 83735 ASSAY OF MAGNESIUM: CPT

## 2023-05-15 RX ADMIN — SODIUM CHLORIDE NR MLS/HR: 900 INJECTION INTRAVENOUS at 17:34

## 2023-05-15 RX ADMIN — HYDROCODONE BITARTRATE AND ACETAMINOPHEN PRN EA: 5; 325 TABLET ORAL at 23:24

## 2023-05-15 RX ADMIN — SODIUM CHLORIDE SCH MLS/HR: 900 INJECTION INTRAVENOUS at 21:01

## 2023-05-15 RX ADMIN — MAGNESIUM SULFATE IN DEXTROSE SCH MLS/HR: 10 INJECTION, SOLUTION INTRAVENOUS at 16:14

## 2023-05-15 RX ADMIN — SODIUM CHLORIDE, SODIUM LACTATE, POTASSIUM CHLORIDE, AND CALCIUM CHLORIDE SCH MLS/HR: 600; 310; 30; 20 INJECTION, SOLUTION INTRAVENOUS at 17:34

## 2023-05-15 RX ADMIN — SODIUM CHLORIDE NR MLS/HR: 900 INJECTION INTRAVENOUS at 16:19

## 2023-05-15 RX ADMIN — SODIUM CHLORIDE NR MLS/HR: 900 INJECTION INTRAVENOUS at 15:30

## 2023-05-15 RX ADMIN — MAGNESIUM SULFATE IN DEXTROSE SCH MLS/HR: 10 INJECTION, SOLUTION INTRAVENOUS at 17:34

## 2023-05-15 NOTE — HISTORY & PHYSICAL-SURGICAL
History of Present Illness


History of Present Illness


Patient Consulted On(parminder/time)


5/15/23


 14:13


Time Seen by Provider:  13:32


History of Present Illness


Surgery asked to admit pt regarding Pneumonia and possible Subcapsular Hepatic 

Abscess





HPI per ED:  PT AMB TO ED BY POV WITH C/O DECREASED APETITE, FATIGUE, AND 

WEAKNESS. PT HAD COCO ON 4/28 AND HAS HAD THESE SX SINCE. DENIES PAIN, FEVER, 

URINARY SX, N/V, SOB. LBM TODAY, NORMAL FOR PT. WIFE REPORTS PT HAS ONLY BEEN 

EATING A FEW, SALTINE CRACKERS A DAY.





Patient is a 77-year-old male who presents to ED with decreased appetite, 

fatigue and weakness.  Patient had his gallbladder removed April 28.  This was 

performed by Dr. Hart.  Since the surgery he has had significant decrease in 

intake.  According to wife not wanting to eat.  Patient ate saltine crackers for

the first 2 to 3 days.  Started to drink maybe 1-2 protein shakes daily.  Has 

not wanting to drink much water or Pedialyte.  He has had some mild right sided 

upper abdominal pain but no pain today.  No fever.  At night according to wife p

atient is just wanting to lie down and sleep.  Seems slightly confused at night 

but patient is tired.  States he is urinating without any difficulties.  Denies 

cough, chest pain, shortness of breath, headache or dizziness.  States he did 

have a soft bowel movement yesterday.  Patient denies fever, vomiting, dysuria, 

hematuria





When I saw pt in the ER he was laying in bed comfortably in no acute distress.  

He denied pain and wife states he really hasn't had pain in a while; when he did

have it, it was RUQ but points to almost flank.  He is weak and has not been 

able to eat.  Wife also states he really wasn't using his Incentive Spirometer 

at home; "only when I could actually get him to use it".  They deny fever at 

home.





Allergies and Home Medications


Allergies


Coded Allergies:  


     naproxen (Unverified  Allergy, Unknown, 1/13/20)





Patient Home Medication List


Home Medication List Reviewed:  Yes


Aspirin (Camp Aspirin) 81 Mg Tablet., 81 MG PO DAILY, (Reported)


   Entered as Reported by: BRITT SOTO on 4/9/13 7134


Atorvastatin Calcium (Lipitor) 10 Mg Tablet, 10 MG PO HS, (Reported)


   Entered as Reported by: AMIE FELIX on 4/28/23 1039


Buspirone HCl (Buspirone HCl) 5 Mg Tablet, 5 MG PO DAILY, (Reported)


   Entered as Reported by: AMIE FELIX on 4/28/23 1039


Cetirizine HCl (Zyrtec) 10 Mg Capsule, 10 MG PO DAILY, (Reported)


   Entered as Reported by: AMIE FELIX on 4/28/23 1039


Cyanocobalamin (Vitamin B12) 1,000 Mcg Tablet.sa, 1,000 MCG PO, (Reported)


   Entered as Reported by: BRITT SOTO on 4/9/13 1244


Escitalopram Oxalate (Lexapro) 20 Mg Tablet, 20 MG PO DAILY, (Reported)


   Entered as Reported by: AMIE FELIX on 4/28/23 1039


Hydrocodone/Acetaminophen (Hydrocodone-Acetamin 5-325 mg) 5 Mg-325 Mg Tablet, 1 

TAB PO Q8H PRN for PAIN-MODERATE (5-7)


   Prescribed by: NOEMI HART on 4/28/23 1231


Ibuprofen (Advil) 200 Mg Capsule, 200 MG PO PRN, (Reported)


   Entered as Reported by: AMIE FELIX on 4/28/23 1039


Losartan/Hydrochlorothiazide (Losartan-Hctz 50-12.5 mg Tab) 50 Mg-12.5 Mg 

Tablet, 1 EACH PO DAILY, (Reported)


   Entered as Reported by: AMIE FELIX on 4/28/23 1039


Mirabegron (Myrbetriq) 50 Mg Tab.er.24h, 50 MG PO DAILY, (Reported)


   Entered as Reported by: AMIE FELIX on 4/28/23 1039


Multivitamin (Multi-Vitamin Daily) 1 Each Tablet, 1 EACH PO, (Reported)


   Entered as Reported by: BRITT SOTO on 4/9/13 1244


Omeprazole (Omeprazole) 20 Mg Capsule.dr, 20 MG PO DAILY, (Reported)


   Entered as Reported by: AMIE FELIX on 4/28/23 1039


Vitamin E Acid Succinate (Vitamin E) 100 Unit Tablet, 100 UNIT PO, (Reported)


   Entered as Reported by: BRITT SOTO on 4/9/13 1244





Past Medical-Social-Family Hx


Patient Social History


Smoking Status:  Former Smoker


Former Smoker, Quit:  Apr 28, 2022


Type Used:  Pipe


Recent Hopitalizations:  No


Alcohol Use?:  No


Have you traveled recently?:  No





Seasonal Allergies


Seasonal Allergies:  Yes





Surgeries


History of Surgeries:  Yes


Surgeries:  Bladder Surgery, Gallbladder, Orthopedic





Respiratory


History of Respiratory Disorde:  Yes


Respiratory Disorders:  Sleep Apnea, COPD





Cardiovascular


History of Cardiac Disorders:  Yes


Cardiac Disorders:  Hypertension





Neurological


History of Neurological Disord:  No





Reproductive System


Sexually Transmitted Disease:  No





Genitourinary


History of Genitourinary Disor:  Yes (urolift)


Genitourinary Disorders:  Kidney Stones





Gastrointestinal


History of Gastrointestinal Di:  Yes


Gastrointestinal Disorders:  Gall Bladder Disease





Musculoskeletal


History of Musculoskeletal Dis:  Yes


Musculoskeletal Disorders:  Arthritis, Fractures





Endocrine


History of Endocrine Disorders:  No





HEENT


History of HEENT Disorders:  Yes


HEENT Disorders:  Cataract


Loss of Vision:  Denies


Hearing Impairment:  Hard of Hearing, Hearing Aide Right, Hearing Aide Left





Cancer


History of Cancer:  No





Psychosocial


History of Psychiatric Problem:  Yes


Behavioral Health Disorders:  ADD/ADHD, Anxiety, Depression





Integumentary


History of Skin or Integumenta:  No





Blood Transfusions


History of Blood Disorders:  No


Adverse Reaction to a Blood Tr:  No





Family Medical History


Significant Family History:  Heart Disease (Father), Cancer (Grandmother), 

Hypertension (Father), Other Conditions/Hx (Mother had "mental problems")





Review of Systems-General


Constitutional:  No fever; malaise, weakness


EENTM:  No blurred vision, No double vision, No mouth swelling


Respiratory:  No cough; dyspnea on exertion; No hemoptysis


Cardiovascular:  No edema, No palpitations


Gastrointestinal:  No abdominal pain, No jaundice; loss of appetite; No melena, 

No nausea, No vomiting


Genitourinary:  No dysuria, No frequency, No hematuria


Musculoskeletal:  joint pain, joint swelling, muscle pain


Skin:  No change in color, No change in hair/nails


Psychiatric/Neurological:  Anxiety, Depressed; Denies Seizure





Physical Exam-General Problems


Physical Exam


Vital Signs





Vital Signs - First Documented








 5/15/23





 11:42


 


Temp 36.6


 


Pulse 90


 


Resp 16


 


B/P (MAP) 137/71 (93)


 


Pulse Ox 95


 


O2 Delivery Room Air





Capillary Refill : Less Than 3 Seconds


General Appearance:  WD/WN, no apparent distress


Eyes:  Bilateral Eye PERRL, Bilateral Eye Abnormal EOM


HEENT:  pharynx normal; No scleral icterus (R), No scleral icterus (L)


Neck:  non-tender, supple


Respiratory:  lungs clear, normal breath sounds, no respiratory distress, no 

accessory muscle use, other (decreased BS at right base and dullness to 

percussion)


Cardiovascular:  regular rate, rhythm, no murmur


Gastrointestinal:  non tender, soft, no organomegaly, hernia (umbilical)


Rectal:  deferred


Back:  CVA tenderness (R); No CVA tenderness (L)


Extremities:  no pedal edema, no calf tenderness, normal capillary refill


Neurologic/Psychiatric:  alert, oriented x 3


Skin:  normal color, warm/dry


Lymphatic:  no adenopathy (neck, axilla or groin)





Data Review


Labs


Laboratory Tests


5/15/23 12:00: 


White Blood Count 18.6H, Red Blood Count 3.25L, Hemoglobin 10.6L, Hematocrit 31L

, Mean Corpuscular Volume 94, Mean Corpuscular Hemoglobin 33, Mean Corpuscular 

Hemoglobin Concent 35, Red Cell Distribution Width 12.2, Platelet Count 472H, 

Mean Platelet Volume 8.2L, Immature Granulocyte % (Auto) 1, Neutrophils (%) 

(Auto) 88H, Lymphocytes (%) (Auto) 5L, Monocytes (%) (Auto) 7, Eosinophils (%) 

(Auto) 0, Basophils (%) (Auto) 0, Neutrophils # (Auto) 16.3H, Lymphocytes # 

(Auto) 0.9L, Monocytes # (Auto) 1.2H, Eosinophils # (Auto) 0.0, Basophils # 

(Auto) 0.0, Immature Granulocyte # (Auto) 0.1, Neutrophils % (Manual) 93, 

Lymphocytes % (Manual) 3, Monocytes % (Manual) 3, Band Neutrophils 1, Blood 

Morphology Comment NORMAL, Sodium Level 129L, Potassium Level 3.1L, Chloride 

Level 95L, Carbon Dioxide Level 22, Anion Gap 12, Blood Urea Nitrogen 16, 

Creatinine 0.87, Estimat Glomerular Filtration Rate 88, BUN/Creatinine Ratio 18,

Glucose Level 121H, Calcium Level 8.6, Corrected Calcium 9.5, Magnesium Level 

1.3L, Total Bilirubin 0.9, Aspartate Amino Transf (AST/SGOT) 59H, Alanine 

Aminotransferase (ALT/SGPT) 49, Alkaline Phosphatase 174H, Total Protein 6.5, 

Albumin 2.9L, Lipase 22


5/15/23 12:10: 


Urine Color YELLOW, Urine Clarity CLEAR, Urine pH 6.0, Urine Specific Gravity 

1.010L, Urine Protein 1+H, Urine Glucose (UA) NEGATIVE, Urine Ketones NEGATIVE, 

Urine Nitrite NEGATIVE, Urine Bilirubin NEGATIVE, Urine Urobilinogen 1.0, Urine 

Leukocyte Esterase NEGATIVE, Urine RBC (Auto) NEGATIVE, Urine RBC RARE, Urine 

WBC RARE, Urine Squamous Epithelial Cells RARE, Urine Crystals PRESENTH, Urine 

Amorphous Sediment RARE TUSHAR URATESH, Urine Bacteria NEGATIVE, Urine Casts NONE,

Urine Mucus NEGATIVE, Urine Culture Indicated NO


5/15/23 13:53: 





Radiology


Date of Exam:05/15/23





CT ABDOMEN/PELVIS W








PROCEDURE: CT abdomen and pelvis with contrast.





TECHNIQUE: Multiple contiguous axial images were obtained through


the abdomen and pelvis after administration of intravenous


contrast. Auto Exposure Controls were utilized during the CT exam


to meet ALARA standards for radiation dose reduction. All CT


scans use one or more of the following dose optimizing


techniques: Automated exposure control, MA and/or KvP adjustment


based on patient size and exam type or iterative reconstruction.





INDICATION: Diminished appetite, fatigue, and weakness since


cholecystectomy was performed on April 28.





COMPARISON: Exam is compared with CT abdomen and pelvis of


04/27/2023.





FINDINGS: A new right dependent pleural effusion layers to 3.9 cm


in thickness. Some subpleural infiltrate or atelectasis in the


right middle lobe and right lower lobe is adjacent to the major


fissure. Left lung base and pleura are negative.





Lateral to the right hepatic lobe and distorting its contour is a


probable complex subcapsular fluid collection measuring 13 cm AP


with 5.4 cm transverse thickness and roughly 10 cm cephalocaudal


height. At its superior margin, there is a small bubble of


intraluminal gas. There is some adjacent stranding of the fat,


and an infected collection could not be excluded. There are no


radiodense ductal calculi. There is no bile duct dilatation. The


pancreas and peripancreatic fat are normal. There is no


hydronephrosis. The spleen and adrenals are negative. The


atherosclerotic aorta is nonaneurysmal. There is a 2 mm punctate


calculus in the left renal lower pole calyx. There is a


noninflamed sigmoid diverticulosis. There are implant seeds in


the prostate which has not enlarged. The urinary bladder is


grossly unremarkable but not well distended limiting its


evaluation. There is no ileus or bowel obstruction, and no


pathological fecal loading.





IMPRESSION:


1. Complex perihepatic subcapsular fluid collection with trace


amounts of air and adjacent edema. Superiorly, this abuts the


undersurface of the diaphragm and is suspicious for an infected


collection. There is a new small right pleural effusion. No


obvious pleural thickening or loculation, but there is subjacent


atelectasis in the right middle and lower lobes. Pneumonia


superimposed could not be excluded.


2. No biliary dilatation or opaque choledocholithiasis with


negative appendix and noninflamed sigmoid diverticulosis.





  Dictated on workstation # [id number]








Dict:   05/15/23 [id number]


Trans:   05/15/23 1311


 [id number]





Interpreted by:     YESI DE OLIVEIRA





Assessment/Plan


Assessment/Plan


Admission Diagonsis


Pneumonia


Subcapsular Hepatic fluid collection


Hypokalemia


Hypomagnesemia


Hypertension


Admission Status:  Observation


Assessment/Plan


Pneumonia


Subcapsular Hepatic fluid collection


Hypokalemia


Hypomagnesemia


Hypertension





I personally reviewed the old CT and this new one; I then went over it with Dr. Catherine and discussed drainage.  I also discussed the case with the ED provider.  

Pt will be admitted for treatment of Pneumonia with IV ABX and will get drainage

of fluid on the liver.  I will also 


replace his Potassium and Magnesium.  I will also restart his HTN meds.  He can 

go on a regular diet once he has drainage performed.  Will get IV fluids, pain 

meds as needed and anti-emetics if needed.  At this point he may only need to 

stay one night, and can try


to switch over to PO ABX for the pneumonia; will probably depend on the results 

of liver drainage.











NOEMI HART DO               May 15, 2023 14:19

## 2023-05-15 NOTE — ED GENERAL
General


Chief Complaint:  General Problems/Pain


Stated Complaint:  POST OP GALL BLADDER 4/28/23 | WEAKNESS


Nursing Triage Note:  


PT AMB TO ED BY POV WITH C/O DECREASED APETITE, FATIGUE, AND WEAKNESS. PT HAD 


COCO ON 4/28 AND HAS HAD THESE SX SINCE. DENIES PAIN, FEVER, URINARY SX, N/V, 


SOB. LBM TODAY, NORMAL FOR PT. WIFE REPORTS PT HAS ONLY BEEN EATING A FEW 


SALTINE CRACKERS A DAY.


Source of Information:  Patient


Exam Limitations:  No Limitations





History of Present Illness


Date Seen by Provider:  May 15, 2023


Time Seen by Provider:  11:58


Initial Comments


Patient is a 77-year-old male who presents to ED with decreased appetite, 

fatigue and weakness.  Patient had his gallbladder removed April 28.  This was 

performed by Dr. Hart.  Since the surgery he has had significant decrease in 

intake.  According to wife not wanting to eat.  Patient ate saltine crackers for

the first 2 to 3 days.  Started to drink maybe 1-2 protein shakes daily.  Has 

not wanting to drink much water or Pedialyte.  He has had some mild right sided 

upper abdominal pain but no pain today.  No fever.  At night according to wife 

patient is just wanting to lie down and sleep.  Seems slightly confused at night

but patient is tired.  States he is urinating without any difficulties.  Denies 

cough, chest pain, shortness of breath, headache or dizziness.  States he did 

have a soft bowel movement yesterday.  Patient denies fever, vomiting, dysuria, 

hematuria





Allergies and Home Medications


Allergies


Coded Allergies:  


     naproxen (Unverified  Allergy, Unknown, 1/13/20)





Patient Home Medication List


Home Medication List Reviewed:  Yes


Aspirin (Aspirin EC) 81 Mg Tablet.dr, 81 MG PO HS, (Reported)


   Entered as Reported by: VALERIE ZAMAN on 5/15/23 1553


   Last Action: Reviewed


Atorvastatin Calcium (Atorvastatin Calcium) 10 Mg Tablet, 10 MG PO HS, (Repo

rted)


   Entered as Reported by: VALERIE ZAMAN on 5/15/23 1553


   Last Action: Reviewed


B Complex with Vitamin C (Super B with Vit C) 1 Each Capsule, 1 EACH PO DAILY, 

(Reported)


   Entered as Reported by: VALERIE ZAMAN on 5/15/23 1553


   Last Action: Reviewed


Cetirizine HCl (Zyrtec) 10 Mg Capsule, 10 MG PO HS, (Reported)


   Entered as Reported by: AMIE FELIX on 4/28/23 1039


   Last Action: Reviewed


Cholecalciferol (Vitamin D3) (Vitamin D3) 50 Mcg (2000 Unit) Tablet, 50 MCG PO 

DAILY, (Reported)


   Entered as Reported by: VALERIE ZAMAN on 5/15/23 1553


   Last Action: Reviewed


Escitalopram Oxalate (Escitalopram Oxalate) 20 Mg Tablet, 20 MG PO HS, 

(Reported)


   Entered as Reported by: VALERIE ZAMAN on 5/15/23 1553


   Last Action: Reviewed


Lactobacillus Acidophilus (Florajen Acidophilus) 20 Billion Cell Capsule, 1 EACH

PO HS, (Reported)


   Entered as Reported by: VALERIE ZAMAN on 5/15/23 1553


   Last Action: Reviewed


Losartan Potassium (Losartan Potassium) 50 Mg Tablet, 50 MG PO HS, (Reported)


   Entered as Reported by: VALERIE ZAMAN on 5/15/23 1553


   Last Action: Reviewed


Mirabegron (Myrbetriq) 50 Mg Tab.er.24h, 50 MG PO HS, (Reported)


   Entered as Reported by: AMIE FELIX on 4/28/23 1039


   Last Action: Reviewed


Multivitamin (Multivitamin) 1 Each Tablet, 1 EACH PO DAILY, (Reported)


   Entered as Reported by: VALERIE ZAMAN on 5/15/23 1553


   Last Action: Reviewed


Omeprazole (Omeprazole) 20 Mg Capsule., 20 MG PO DAILY, (Reported)


   Entered as Reported by: AMIE FELIX on 4/28/23 1039


   Last Action: Reviewed


Discontinued Medications


Aspirin ([place] Aspirin) 81 Mg Tablet., 81 MG PO DAILY, (Reported)


   Discontinued Reason: Duplicate Order


   Entered as Reported by: BRITT SOTO on 4/9/13 1244


   Last Action: Discontinued


Atorvastatin Calcium (Lipitor) 10 Mg Tablet, 10 MG PO HS, (Reported)


   Discontinued Reason: Duplicate Order


   Entered as Reported by: AMIE FELIX on 4/28/23 1039


   Last Action: Discontinued


Buspirone HCl (Buspirone HCl) 5 Mg Tablet, 5 MG PO DAILY, (Reported)


   Discontinued Reason: Duplicate Order


   Entered as Reported by: AMIE FELIX on 4/28/23 1039


   Last Action: Discontinued


Cyanocobalamin (Vitamin B12) 1,000 Mcg Tablet.sa, 1,000 MCG PO, (Reported)


   Discontinued Reason: Duplicate Order


   Entered as Reported by: BRITT SOTO on 4/9/13 1244


   Last Action: Discontinued


Escitalopram Oxalate (Lexapro) 20 Mg Tablet, 20 MG PO DAILY, (Reported)


   Discontinued Reason: Duplicate Order


   Entered as Reported by: AMIE FELIX on 4/28/23 1039


   Last Action: Discontinued


Hydrocodone/Acetaminophen (Hydrocodone-Acetamin 5-325 mg) 5 Mg-325 Mg Tablet, 1 

TAB PO Q8H PRN for PAIN-MODERATE (5-7)


   Discontinued Reason: Duplicate Order


   Prescribed by: NOEMI HART on 4/28/23 1231


   Last Action: Discontinued


Ibuprofen (Advil) 200 Mg Capsule, 200 MG PO PRN, (Reported)


   Discontinued Reason: Duplicate Order


   Entered as Reported by: AMIE FELIX on 4/28/23 1039


   Last Action: Discontinued


Losartan/Hydrochlorothiazide (Losartan-Hctz 50-12.5 mg Tab) 50 Mg-12.5 Mg 

Tablet, 1 EACH PO DAILY, (Reported)


   Discontinued Reason: Duplicate Order


   Entered as Reported by: AMIE FELIX on 4/28/23 1039


   Last Action: Discontinued


Multivitamin (Multi-Vitamin Daily) 1 Each Tablet, 1 EACH PO, (Reported)


   Discontinued Reason: Duplicate Order


   Entered as Reported by: BRITT SOTO on 4/9/13 1244


   Last Action: Discontinued


Vitamin E Acid Succinate (Vitamin E) 100 Unit Tablet, 100 UNIT PO, (Reported)


   Discontinued Reason: Duplicate Order


   Entered as Reported by: BRITT SOTO on 4/9/13 1244


   Last Action: Discontinued





Review of Systems


Review of Systems


Constitutional:  No chills, No diaphoresis; malaise, weakness


EENTM:  No ear pain, No blurred vision, No double vision, No dental problems, No

mouth pain, No mouth swelling, No throat pain, No throat swelling


Respiratory:  No cough, No dyspnea on exertion, No short of breath


Cardiovascular:  No chest pain


Gastrointestinal:  abdominal pain; No diarrhea, No nausea, No vomiting


Genitourinary:  No decreased output, No discharge


Musculoskeletal:  No back pain, No joint pain


Skin:  No change in color, No change in hair/nails





All Other Systems Reviewed


Negative Unless Noted:  Yes





Past Medical-Social-Family Hx


Patient Social History


Tobacco Use?:  No


Use of E-Cig and/or Vaping dev:  No


Substance use?:  No


Alcohol Use?:  No


Pt feels they are or have been:  No





Immunizations Up To Date


Influenza Vaccine Up-to-Date:  Yes; Up-to-Date


First/Initial COVID19 Vaccinat:  X4


Second COVID19 Vaccination [place]:  X4


Third COVID19 Vaccination Date:  X4





Seasonal Allergies


Seasonal Allergies:  Yes





Past Medical History


Surgery/Hospitalization HX:  


Hypertension











 COCO


Surgeries:  Yes


Bladder Surgery, Orthopedic


Respiratory:  Yes


Sleep Apnea, COPD


Currently Using CPAP:  Yes


Hypertension


Neurological:  No


Sexually Transmitted Disease:  No


Genitourinary:  Yes (urolift)


Kidney Stones


Gastrointestinal:  Yes


Gall Bladder Disease


Fractures


Endocrine:  No


HEENT:  Yes


Cataract


Loss of Vision:  Denies


Hearing Impairment:  Hearing Aide Right, Hearing Aide Left


Cancer:  No


Psychosocial:  Yes


ADD/ADHD, Depression


Integumentary:  No


Blood Disorders:  No


Adverse Reaction/Blood Tranf:  No





Physical Exam


Vital Signs





Vital Signs - First Documented








 5/15/23





 11:42


 


Temp 36.6


 


Pulse 90


 


Resp 16


 


B/P (MAP) 137/71 (93)


 


Pulse Ox 95


 


O2 Delivery Room Air





Capillary Refill : Less Than 3 Seconds


Height, Weight, BMI


Height: '"


Weight: lbs. oz. kg; 26.00 BMI


Method:


General Appearance:  No Apparent Distress, WD/WN


Eyes:  Bilateral Eye Normal Inspection, Bilateral Eye PERRL, Bilateral Eye EOMI


HEENT:  PERRL/EOMI, TMs Normal, Normal ENT Inspection, Pharynx Normal


Neck:  Full Range of Motion, Normal Inspection, Non Tender, Supple


Respiratory:  Chest Non Tender, Lungs Clear, Normal Breath Sounds, No Accessory 

Muscle Use, No Respiratory Distress


Cardiovascular:  Regular Rate, Rhythm, No Edema, No Gallop, No JVD


Gastrointestinal:  Normal Bowel Sounds, No Organomegaly, Non Tender, Soft, Other

(Healing surgical wounds to the abdomen.  No surrounding redness or swelling.  

Soft abdomen)


Back:  Normal Inspection, No CVA Tenderness, No Vertebral Tenderness


Extremity:  Normal Capillary Refill, Normal Inspection, Normal Range of Motion, 

Non Tender


Neurologic/Psychiatric:  Alert, Oriented x3, No Motor/Sensory Deficits, Normal 

Mood/Affect, CNs II-XII Norm as Tested


Skin:  Normal Color, Warm/Dry





Focused Exam


Lactate Level


5/15/23 13:53: Lactic Acid Level 0.92





Lactic Acid Level





Laboratory Tests








Test


 5/15/23


13:53


 


Lactic Acid Level


 0.92 MMOL/L


(0.50-2.00)











Progress/Results/Core Measures


Suspected Sepsis


SIRS


Temperature: 


Pulse: 90 


Respiratory Rate: 16


 


Laboratory Tests


5/15/23 12:00: White Blood Count 18.6H


Blood Pressure 137 /71 


Mean: 93


 





5/15/23 13:53: Lactic Acid Level 0.92


Laboratory Tests


5/15/23 12:00: 


Creatinine 0.87, Platelet Count 472H, Total Bilirubin 0.9


5/15/23 14:38: INR Comment 1.2








Results/Orders


Lab Results





Laboratory Tests








Test


 5/15/23


12:00 5/15/23


12:10 5/15/23


13:53 5/15/23


14:38 Range/Units


 


 


White Blood Count


 18.6 H


 


 


 


 4.3-11.0


10^3/uL


 


Red Blood Count


 3.25 L


 


 


 


 4.30-5.52


10^6/uL


 


Hemoglobin 10.6 L    13.3-17.7  g/dL


 


Hematocrit 31 L    40-54  %


 


Mean Corpuscular Volume 94     80-99  fL


 


Mean Corpuscular Hemoglobin 33     25-34  pg


 


Mean Corpuscular Hemoglobin


Concent 35 


 


 


 


 32-36  g/dL





 


Red Cell Distribution Width 12.2     10.0-14.5  %


 


Platelet Count


 472 H


 


 


 


 130-400


10^3/uL


 


Mean Platelet Volume 8.2 L    9.0-12.2  fL


 


Immature Granulocyte % (Auto) 1      %


 


Neutrophils (%) (Auto) 88 H    42-75  %


 


Lymphocytes (%) (Auto) 5 L    12-44  %


 


Monocytes (%) (Auto) 7     0-12  %


 


Eosinophils (%) (Auto) 0     0-10  %


 


Basophils (%) (Auto) 0     0-10  %


 


Neutrophils # (Auto)


 16.3 H


 


 


 


 1.8-7.8


10^3/uL


 


Lymphocytes # (Auto)


 0.9 L


 


 


 


 1.0-4.0


10^3/uL


 


Monocytes # (Auto)


 1.2 H


 


 


 


 0.0-1.0


10^3/uL


 


Eosinophils # (Auto)


 0.0 


 


 


 


 0.0-0.3


10^3/uL


 


Basophils # (Auto)


 0.0 


 


 


 


 0.0-0.1


10^3/uL


 


Immature Granulocyte # (Auto)


 0.1 


 


 


 


 0.0-0.1


10^3/uL


 


Neutrophils % (Manual) 93      %


 


Lymphocytes % (Manual) 3      %


 


Monocytes % (Manual) 3      %


 


Band Neutrophils 1      %


 


Blood Morphology Comment NORMAL      


 


Sodium Level 129 L    135-145  MMOL/L


 


Potassium Level 3.1 L    3.6-5.0  MMOL/L


 


Chloride Level 95 L      MMOL/L


 


Carbon Dioxide Level 22     21-32  MMOL/L


 


Anion Gap 12     5-14  MMOL/L


 


Blood Urea Nitrogen 16     7-18  MG/DL


 


Creatinine


 0.87 


 


 


 


 0.60-1.30


MG/DL


 


Estimat Glomerular Filtration


Rate 88 


 


 


 


  





 


BUN/Creatinine Ratio 18      


 


Glucose Level 121 H      MG/DL


 


Calcium Level 8.6     8.5-10.1  MG/DL


 


Corrected Calcium 9.5     8.5-10.1  MG/DL


 


Magnesium Level 1.3 L    1.6-2.4  MG/DL


 


Total Bilirubin 0.9     0.1-1.0  MG/DL


 


Aspartate Amino Transf


(AST/SGOT) 59 H


 


 


 


 5-34  U/L





 


Alanine Aminotransferase


(ALT/SGPT) 49 


 


 


 


 0-55  U/L





 


Alkaline Phosphatase 174 H      U/L


 


Total Protein 6.5     6.4-8.2  GM/DL


 


Albumin 2.9 L    3.2-4.5  GM/DL


 


Lipase 22     8-78  U/L


 


Urine Color  YELLOW     


 


Urine Clarity  CLEAR     


 


Urine pH  6.0    5-9  


 


Urine Specific Gravity  1.010 L   1.016-1.022  


 


Urine Protein  1+ H   NEGATIVE  


 


Urine Glucose (UA)  NEGATIVE    NEGATIVE  


 


Urine Ketones  NEGATIVE    NEGATIVE  


 


Urine Nitrite  NEGATIVE    NEGATIVE  


 


Urine Bilirubin  NEGATIVE    NEGATIVE  


 


Urine Urobilinogen  1.0    < = 1.0  MG/DL


 


Urine Leukocyte Esterase  NEGATIVE    NEGATIVE  


 


Urine RBC (Auto)  NEGATIVE    NEGATIVE  


 


Urine RBC  RARE     /HPF


 


Urine WBC  RARE     /HPF


 


Urine Squamous Epithelial


Cells 


 RARE 


 


 


  /HPF





 


Urine Crystals  PRESENT H    /LPF


 


Urine Amorphous Sediment


 


 RARE TUSHAR


URATES H 


 


  /LPF





 


Urine Bacteria  NEGATIVE     /HPF


 


Urine Casts  NONE     /LPF


 


Urine Mucus  NEGATIVE     /LPF


 


Urine Culture Indicated  NO     


 


Lactic Acid Level


 


 


 0.92 


 


 0.50-2.00


MMOL/L


 


Prothrombin Time    15.2 H 12.2-14.7  SEC


 


INR Comment    1.2  0.8-1.4  








My Orders





Orders - ROSETTE CARDOSO


Cbc With Automated Diff (5/15/23 11:56)


Comprehensive Metabolic Panel (5/15/23 11:56)


Lipase (5/15/23 11:56)


Magnesium (5/15/23 11:56)


Chest 1 View, Ap/Pa Only (5/15/23 11:56)


Ns Iv 1000 Ml (Sodium Chloride 0.9%) (5/15/23 11:56)


Ua Culture If Indicated (5/15/23 11:56)


Ct Abdomen/Pelvis W (5/15/23 11:56)


Manual Differential (5/15/23 12:00)


Iohexol Injection (Omnipaque 350 Mg/Ml 1 (5/15/23 12:30)


Ns (Ivpb) (Sodium Chloride 0.9% Ivpb Bag (5/15/23 12:30)


Blood Culture (5/15/23 13:35)


Lactic Acid Analyzer (5/15/23 13:35)


Blood Culture (5/15/23 13:40)


Ed Admission (Communication) (5/15/23 13:57)


Cefepime Injection (Maxipime Injection) (5/15/23 14:00)


Potassium Cl 10meq/50ml Ivpb (Kcl 10 Meq (5/15/23 14:00)


Ns Iv 1000 Ml (Sodium Chloride 0.9%) (5/15/23 14:06)


Ns Iv 1000 Ml (Sodium Chloride 0.9%) (5/15/23 14:08)





Medications Given in ED





Current Medications








 Medications  Dose


 Ordered  Sig/Kathy


 Route  Start Time


 Stop Time Status Last Admin


Dose Admin


 


 Cefepime HCl 1000


 mg/Sodium Chloride  50 ml @ 


 100 mls/hr  ONCE  ONCE


 IV  5/15/23 14:00


 5/15/23 14:29 DC 5/15/23 14:42


100 MLS/HR


 


 Iohexol  100 ml  ONCE  ONCE


 IV  5/15/23 12:30


 5/15/23 12:32 DC 5/15/23 12:45


80 ML


 


 Potassium Chloride  50 ml @ 50


 mls/hr  ONCE  ONCE


 IV  5/15/23 14:00


 5/15/23 14:59 DC 5/15/23 14:41


50 MLS/HR


 


 Sodium Chloride  100 ml  ONCE  ONCE


 IV  5/15/23 12:30


 5/15/23 12:32 DC 5/15/23 12:45


80 ML








Vital Signs/I&O











 5/15/23





 11:42


 


Temp 36.6


 


Pulse 90


 


Resp 16


 


B/P (MAP) 137/71 (93)


 


Pulse Ox 95


 


O2 Delivery Room Air





Capillary Refill : Less Than 3 Seconds








Blood Pressure Mean:                    93











Departure


Communication (PCP)


Reviewed previous ER visits, H&P, lab testing, surgical notes.  Patient had a 

cholecystectomy performed by Dr. Hart on April 28.  Since the surgery has not 

been wanting to eat or drink.  Decreased appetite.  Some right-sided abdominal 

pain since the surgery but no specific pain today.  No fever.  No vomiting or 

diarrhea.  On arrival patient vital signs stable.  Denies of any specific cough.

 Has been using his spirometer.  Denies feeling short of breath or having chest 

pain.  On exam the soft abdomen.  Surgical sites appear to be healing without 

evidence of cellulitis.  Due to current complaint general lab work, chest x-ray 

CT abdomen and pelvis rule out postop complications, pneumonia.  Urinalysis was 

ordered.  CBC showed an elevated white blood count 18.  Hemoglobin of 10.6 

dropped from 13 on April 27.  Elevated platelets.  Chemistry showed sodium 131, 

chloride 97, potassium 3.1, magnesium 1.3.  Patient was started on IV potassium 

10 mill equivalent.  Patient was given a liter of fluid initially.  Patient was 

given a second liter of fluid.  Chest x-ray concerning for right pleural effu

gustavo versus pneumonia.  CT abdomen and pelvis shows a complex subcapsular fluid 

collection with trace amounts of air and adjacent edema.  Suspicious for 

infected collection.  New small right pleural effusion.  No obvious pleural 

thickening or loculation.  There is subadjacent atelectasis in the right middle 

lower lobes.  Pneumonia superimposed cannot be excluded. Patient Was started on 

cefepime for potential pneumonia.  Patient was discussed with Dr. Hart general

surgeon who saw patient here in the ER.  Did not seem concerned that this was 

infected fluid collection.  Dr. Hart did recommend a guided drainage of the 

fluid collection.  This was discussed with Dr. Catherine by Dr. Hart who may 

perform procedure today or tomorrow.  Recommend starting antibiotic for 

pneumonia.  Patient was discussed with Dr. RODRIGUEZ who felt like this is more 

of a surgical case and wanted me to admit to Dr. Hart.  Dr. Hart agreed to 

admit.  Patient was given IV potassium 10 meq.  Currently n.p.o. patient 

afebrile.  No current pain at this time. magnesium oral supplement





Impression





   Primary Impression:  


   Pneumonia


   Additional Impressions:  


   Intraabdominal fluid collection


   Hypokalemia


   Hypomagnesemia


Disposition:  09 ADMITTED AS INPATIENT


Condition:  Stable





Admissions


Decision to Admit Reason:  Admit from ER (General)


Decision to Admit/Date:  May 15, 2023


Time/Decision to Admit Time:  13:39





Departure-Patient Inst.


Referrals:  


FIORDALIZA MCGUIRE MD (PCP)


Primary Care Physician








PADMINI RODRIGUEZ MD (Family)


Primary Care Physician











ROSETTE CARDOSO          May 15, 2023 12:01

## 2023-05-15 NOTE — DIAGNOSTIC IMAGING REPORT
PROCEDURE: CT abdomen and pelvis with contrast.



TECHNIQUE: Multiple contiguous axial images were obtained through

the abdomen and pelvis after administration of intravenous

contrast. Auto Exposure Controls were utilized during the CT exam

to meet ALARA standards for radiation dose reduction. All CT

scans use one or more of the following dose optimizing

techniques: Automated exposure control, MA and/or KvP adjustment

based on patient size and exam type or iterative reconstruction.



INDICATION: Diminished appetite, fatigue, and weakness since

cholecystectomy was performed on April 28.



COMPARISON: Exam is compared with CT abdomen and pelvis of

04/27/2023.



FINDINGS: A new right dependent pleural effusion layers to 3.9 cm

in thickness. Some subpleural infiltrate or atelectasis in the

right middle lobe and right lower lobe is adjacent to the major

fissure. Left lung base and pleura are negative.



Lateral to the right hepatic lobe and distorting its contour is a

probable complex subcapsular fluid collection measuring 13 cm AP

with 5.4 cm transverse thickness and roughly 10 cm cephalocaudal

height. At its superior margin, there is a small bubble of

intraluminal gas. There is some adjacent stranding of the fat,

and an infected collection could not be excluded. There are no

radiodense ductal calculi. There is no bile duct dilatation. The

pancreas and peripancreatic fat are normal. There is no

hydronephrosis. The spleen and adrenals are negative. The

atherosclerotic aorta is nonaneurysmal. There is a 2 mm punctate

calculus in the left renal lower pole calyx. There is a

noninflamed sigmoid diverticulosis. There are implant seeds in

the prostate which has not enlarged. The urinary bladder is

grossly unremarkable but not well distended limiting its

evaluation. There is no ileus or bowel obstruction, and no

pathological fecal loading.



IMPRESSION:

1. Complex perihepatic subcapsular fluid collection with trace

amounts of air and adjacent edema. Superiorly, this abuts the

undersurface of the diaphragm and is suspicious for an infected

collection. There is a new small right pleural effusion. No

obvious pleural thickening or loculation, but there is subjacent

atelectasis in the right middle and lower lobes. Pneumonia

superimposed could not be excluded.

2. No biliary dilatation or opaque choledocholithiasis with

negative appendix and noninflamed sigmoid diverticulosis.



Dictated by: 



  Dictated on workstation # [id number]

## 2023-05-15 NOTE — DIAGNOSTIC IMAGING REPORT
INDICATION:  

Cough. 



COMPARISON: 

05/23/2022. 



FINDINGS: 

There is a right pleural effusion and subjacent lower lobe

consolidation. While some of this is atelectasis, pneumonia is

suspected. The left lung and pleural space are negative. There is

no failure pattern. 



IMPRESSION: 

Right basilar pleural fluid and infiltrate.



Dictated by: 



  Dictated on workstation # [id number]

## 2023-05-16 VITALS — DIASTOLIC BLOOD PRESSURE: 64 MMHG | SYSTOLIC BLOOD PRESSURE: 151 MMHG

## 2023-05-16 VITALS — SYSTOLIC BLOOD PRESSURE: 146 MMHG | DIASTOLIC BLOOD PRESSURE: 65 MMHG

## 2023-05-16 VITALS — DIASTOLIC BLOOD PRESSURE: 65 MMHG | SYSTOLIC BLOOD PRESSURE: 134 MMHG

## 2023-05-16 VITALS — SYSTOLIC BLOOD PRESSURE: 142 MMHG | DIASTOLIC BLOOD PRESSURE: 69 MMHG

## 2023-05-16 VITALS — DIASTOLIC BLOOD PRESSURE: 64 MMHG | SYSTOLIC BLOOD PRESSURE: 132 MMHG

## 2023-05-16 VITALS — SYSTOLIC BLOOD PRESSURE: 149 MMHG | DIASTOLIC BLOOD PRESSURE: 73 MMHG

## 2023-05-16 VITALS — DIASTOLIC BLOOD PRESSURE: 64 MMHG | SYSTOLIC BLOOD PRESSURE: 138 MMHG

## 2023-05-16 VITALS — SYSTOLIC BLOOD PRESSURE: 146 MMHG | DIASTOLIC BLOOD PRESSURE: 76 MMHG

## 2023-05-16 VITALS — SYSTOLIC BLOOD PRESSURE: 155 MMHG | DIASTOLIC BLOOD PRESSURE: 58 MMHG

## 2023-05-16 VITALS — SYSTOLIC BLOOD PRESSURE: 143 MMHG | DIASTOLIC BLOOD PRESSURE: 57 MMHG

## 2023-05-16 VITALS — SYSTOLIC BLOOD PRESSURE: 131 MMHG | DIASTOLIC BLOOD PRESSURE: 64 MMHG

## 2023-05-16 VITALS — DIASTOLIC BLOOD PRESSURE: 65 MMHG | SYSTOLIC BLOOD PRESSURE: 135 MMHG

## 2023-05-16 LAB
ALBUMIN SERPL-MCNC: 2.6 GM/DL (ref 3.2–4.5)
ALP SERPL-CCNC: 162 U/L (ref 40–136)
ALT SERPL-CCNC: 51 U/L (ref 0–55)
BASOPHILS # BLD AUTO: 0 10^3/UL (ref 0–0.1)
BASOPHILS NFR BLD AUTO: 0 % (ref 0–10)
BILIRUB SERPL-MCNC: 0.9 MG/DL (ref 0.1–1)
BUN/CREAT SERPL: 13
CALCIUM SERPL-MCNC: 8.6 MG/DL (ref 8.5–10.1)
CHLORIDE SERPL-SCNC: 100 MMOL/L (ref 98–107)
CO2 SERPL-SCNC: 24 MMOL/L (ref 21–32)
CREAT SERPL-MCNC: 0.92 MG/DL (ref 0.6–1.3)
EOSINOPHIL # BLD AUTO: 0 10^3/UL (ref 0–0.3)
EOSINOPHIL NFR BLD AUTO: 0 % (ref 0–10)
GFR SERPLBLD BASED ON 1.73 SQ M-ARVRAT: 85 ML/MIN
GLUCOSE SERPL-MCNC: 111 MG/DL (ref 70–105)
HCT VFR BLD CALC: 28 % (ref 40–54)
HGB BLD-MCNC: 9.5 G/DL (ref 13.3–17.7)
LYMPHOCYTES # BLD AUTO: 0.9 10^3/UL (ref 1–4)
LYMPHOCYTES NFR BLD AUTO: 6 % (ref 12–44)
MANUAL DIFFERENTIAL PERFORMED BLD QL: NO
MCH RBC QN AUTO: 32 PG (ref 25–34)
MCHC RBC AUTO-ENTMCNC: 34 G/DL (ref 32–36)
MCV RBC AUTO: 95 FL (ref 80–99)
MONOCYTES # BLD AUTO: 0.9 10^3/UL (ref 0–1)
MONOCYTES NFR BLD AUTO: 6 % (ref 0–12)
NEUTROPHILS # BLD AUTO: 13.6 10^3/UL (ref 1.8–7.8)
NEUTROPHILS NFR BLD AUTO: 87 % (ref 42–75)
PLATELET # BLD: 429 10^3/UL (ref 130–400)
PMV BLD AUTO: 8.5 FL (ref 9–12.2)
POTASSIUM SERPL-SCNC: 3.2 MMOL/L (ref 3.6–5)
PROT SERPL-MCNC: 5.9 GM/DL (ref 6.4–8.2)
SODIUM SERPL-SCNC: 134 MMOL/L (ref 135–145)
WBC # BLD AUTO: 15.6 10^3/UL (ref 4.3–11)

## 2023-05-16 PROCEDURE — 0F9030Z DRAINAGE OF LIVER WITH DRAINAGE DEVICE, PERCUTANEOUS APPROACH: ICD-10-PCS

## 2023-05-16 RX ADMIN — SODIUM CHLORIDE, SODIUM LACTATE, POTASSIUM CHLORIDE, AND CALCIUM CHLORIDE SCH MLS/HR: 600; 310; 30; 20 INJECTION, SOLUTION INTRAVENOUS at 05:51

## 2023-05-16 RX ADMIN — SODIUM CHLORIDE SCH MLS/HR: 900 INJECTION INTRAVENOUS at 20:40

## 2023-05-16 RX ADMIN — SODIUM CHLORIDE SCH MLS/HR: 900 INJECTION INTRAVENOUS at 13:14

## 2023-05-16 RX ADMIN — SODIUM CHLORIDE, SODIUM LACTATE, POTASSIUM CHLORIDE, AND CALCIUM CHLORIDE SCH MLS/HR: 600; 310; 30; 20 INJECTION, SOLUTION INTRAVENOUS at 03:58

## 2023-05-16 RX ADMIN — ATORVASTATIN CALCIUM SCH MG: 10 TABLET, FILM COATED ORAL at 20:34

## 2023-05-16 RX ADMIN — SODIUM CHLORIDE SCH MLS/HR: 900 INJECTION INTRAVENOUS at 04:32

## 2023-05-16 RX ADMIN — LORATADINE SCH MG: 10 TABLET ORAL at 20:35

## 2023-05-16 RX ADMIN — SODIUM CHLORIDE, SODIUM LACTATE, POTASSIUM CHLORIDE, AND CALCIUM CHLORIDE SCH MLS/HR: 600; 310; 30; 20 INJECTION, SOLUTION INTRAVENOUS at 11:34

## 2023-05-16 RX ADMIN — POTASSIUM CHLORIDE SCH MEQ: 1500 TABLET, EXTENDED RELEASE ORAL at 06:49

## 2023-05-16 RX ADMIN — SODIUM CHLORIDE, SODIUM LACTATE, POTASSIUM CHLORIDE, AND CALCIUM CHLORIDE SCH MLS/HR: 600; 310; 30; 20 INJECTION, SOLUTION INTRAVENOUS at 21:55

## 2023-05-16 RX ADMIN — HYDROCODONE BITARTRATE AND ACETAMINOPHEN PRN EA: 5; 325 TABLET ORAL at 19:36

## 2023-05-16 RX ADMIN — MIRABEGRON SCH MG: 25 TABLET, FILM COATED, EXTENDED RELEASE ORAL at 20:36

## 2023-05-16 RX ADMIN — LOSARTAN POTASSIUM SCH MG: 50 TABLET, FILM COATED ORAL at 20:34

## 2023-05-16 NOTE — PROGRESS NOTE - SURGERY
JUSTYNA BRYSON 5/16/23 0741:


Subjective


Date Seen by a Provider:  May 16, 2023


Time Seen by a Provider:  07:10


Subjective/Events-last exam


Patient resting comfortably in bed. patient reports he is doing well. Denies 

nausea, vomiting, abdominal pain. Reports he ate a bite of a hamburger last ni

ght, but hasn't really had an appetite. patient states that he slept well last 

night.


Review of Systems


General:  No Chills, No Night Sweats


HEENT:  No Head Aches, No Visual Changes


Pulmonary:  No Dyspnea, No Cough


Cardiovascular:  No: Chest Pain, Palpitations


Gastrointestinal:  No: Nausea, Vomiting, Abdominal Pain


Genitourinary:  No Dysuria, No Frequency


Musculoskeletal:  No: neck pain, shoulder pain


Neurological:  No: Change in speech, Confusion





Focused Exam


Lactate Level


5/15/23 13:53: Lactic Acid Level 0.92





Objective


Exam





Vital Signs








  Date Time  Temp Pulse Resp B/P (MAP) Pulse Ox O2 Delivery O2 Flow Rate FiO2


 


5/16/23 07:18 37.0 82 20 131/64 (86) 90 Room Air  


 


5/16/23 04:28 36.2 62 22 142/69 (93) 95 NIV CPAP 2.00 


 


5/15/23 23:27 37.5 81 18 152/68 (96) 92 Room Air  


 


5/15/23 20:00      Room Air  


 


5/15/23 19:58 37.8 81 17 154/70 (98) 91 Room Air  


 


5/15/23 15:40      Room Air  


 


5/15/23 15:05 37.6 89 18 159/70 (99) 94 Room Air  


 


5/15/23 15:00  87 16 150/73 95 Room Air  


 


5/15/23 11:42 36.6 90 16 137/71 (93) 95 Room Air  














I & O 


 


 5/16/23





 07:00


 


Intake Total 750 ml


 


Balance 750 ml





Capillary Refill : Less Than 3 Seconds


General Appearance:  No Apparent Distress, WD/WN


HEENT:  PERRL/EOMI


Neck:  Full Range of Motion


Respiratory:  Chest Non Tender, No Accessory Muscle Use, No Respiratory 

Distress, Decreased Breath Sounds (R lung base)


Cardiovascular:  Regular Rate, Rhythm, No Edema, No Murmur


Peripheral Pulses:  3+ Radial Pulses (R), 3+ Radial Pulses (L)


Gastrointestinal:  non tender, soft; No tenderness; other (healing surgical 

wounds with no redness, swelling, drainage)


Extremity:  Non Tender, No Pedal Edema


Neurologic/Psychiatric:  Alert, Oriented x3


Skin:  Normal Color, Warm/Dry





Results


Lab


Laboratory Tests


5/15/23 12:00: 


White Blood Count 18.6H, Red Blood Count 3.25L, Hemoglobin 10.6L, Hematocrit 31L

, Mean Corpuscular Volume 94, Mean Corpuscular Hemoglobin 33, Mean Corpuscular 

Hemoglobin Concent 35, Red Cell Distribution Width 12.2, Platelet Count 472H, 

Mean Platelet Volume 8.2L, Immature Granulocyte % (Auto) 1, Neutrophils (%) 

(Auto) 88H, Lymphocytes (%) (Auto) 5L, Monocytes (%) (Auto) 7, Eosinophils (%) 

(Auto) 0, Basophils (%) (Auto) 0, Neutrophils # (Auto) 16.3H, Lymphocytes # 

(Auto) 0.9L, Monocytes # (Auto) 1.2H, Eosinophils # (Auto) 0.0, Basophils # 

(Auto) 0.0, Immature Granulocyte # (Auto) 0.1, Neutrophils % (Manual) 93, 

Lymphocytes % (Manual) 3, Monocytes % (Manual) 3, Band Neutrophils 1, Blood 

Morphology Comment NORMAL, Sodium Level 129L, Potassium Level 3.1L, Chloride 

Level 95L, Carbon Dioxide Level 22, Anion Gap 12, Blood Urea Nitrogen 16, 

Creatinine 0.87, Estimat Glomerular Filtration Rate 88, BUN/Creatinine Ratio 18,

Glucose Level 121H, Calcium Level 8.6, Corrected Calcium 9.5, Magnesium Level 

1.3L, Total Bilirubin 0.9, Aspartate Amino Transf (AST/SGOT) 59H, Alanine 

Aminotransferase (ALT/SGPT) 49, Alkaline Phosphatase 174H, Total Protein 6.5, 

Albumin 2.9L, Lipase 22


5/15/23 12:10: 


Urine Color YELLOW, Urine Clarity CLEAR, Urine pH 6.0, Urine Specific Gravity 

1.010L, Urine Protein 1+H, Urine Glucose (UA) NEGATIVE, Urine Ketones NEGATIVE, 

Urine Nitrite NEGATIVE, Urine Bilirubin NEGATIVE, Urine Urobilinogen 1.0, Urine 

Leukocyte Esterase NEGATIVE, Urine RBC (Auto) NEGATIVE, Urine RBC RARE, Urine 

WBC RARE, Urine Squamous Epithelial Cells RARE, Urine Crystals PRESENTH, Urine 

Amorphous Sediment RARE TUSHAR URATESH, Urine Bacteria NEGATIVE, Urine Casts NONE,

Urine Mucus NEGATIVE, Urine Culture Indicated NO


5/15/23 13:53: Lactic Acid Level 0.92


5/15/23 14:38: 


Prothrombin Time 15.2H, INR Comment 1.2


5/16/23 05:20: 


White Blood Count 15.6H, Red Blood Count 2.94L, Hemoglobin 9.5L, Hematocrit 28L,

Mean Corpuscular Volume 95, Mean Corpuscular Hemoglobin 32, Mean Corpuscular 

Hemoglobin Concent 34, Red Cell Distribution Width 12.3, Platelet Count 429H, 

Mean Platelet Volume 8.5L, Immature Granulocyte % (Auto) 1, Neutrophils (%) 

(Auto) 87H, Lymphocytes (%) (Auto) 6L, Monocytes (%) (Auto) 6, Eosinophils (%) 

(Auto) 0, Basophils (%) (Auto) 0, Neutrophils # (Auto) 13.6H, Lymphocytes # 

(Auto) 0.9L, Monocytes # (Auto) 0.9, Eosinophils # (Auto) 0.0, Basophils # 

(Auto) 0.0, Immature Granulocyte # (Auto) 0.1, Sodium Level 134L, Potassium 

Level 3.2L, Chloride Level 100, Carbon Dioxide Level 24, Anion Gap 10, Blood U

clifford Nitrogen 12, Creatinine 0.92, Estimat Glomerular Filtration Rate 85, 

BUN/Creatinine Ratio 13, Glucose Level 111H, Calcium Level 8.6, Corrected 

Calcium 9.7, Total Bilirubin 0.9, Aspartate Amino Transf (AST/SGOT) 63H, Alanine

Aminotransferase (ALT/SGPT) 51, Alkaline Phosphatase 162H, Total Protein 5.9L, 

Albumin 2.6L





Assessment/Plan


Pneumonia


Subcapsular Hepatic fluid collection


Hypokalemia


Hypomagnesemia


Hypertension





Potassium 3.2 this morning. BP normal this morning after restarting patients HTN

medications. Plan for drainage of fluid on he liver.





NOEMI ARITA DO 5/16/23 1039:


Subjective


Time Seen by a Provider:  09:44


Subjective/Events-last exam


Pt seen sitting up in chair, he just got back from CT guided drainage.  He has 

no complaints.


Review of Systems


General:  Malaise


Pulmonary:  No Dyspnea, No Cough


Cardiovascular:  No: Chest Pain, Palpitations


Gastrointestinal:  No: Nausea, Vomiting, Abdominal Pain





Objective


Exam


General Appearance:  No Apparent Distress, Thin


HEENT:  PERRL/EOMI, Moist Mucous Membranes


Respiratory:  Chest Non Tender, No Accessory Muscle Use, No Respiratory Di

stress, Decreased Breath Sounds (R lung base)


Cardiovascular:  Regular Rate, Rhythm, No Murmur


Gastrointestinal:  non tender, soft, other (healing surgical wounds with no 

redness, swelling, drainage.  Has drain coming out of right flank)


Extremity:  No Calf Tenderness, No Pedal Edema


Neurologic/Psychiatric:  Alert, Oriented x3





Assessment/Plan


Pneumonia


Subcapsular Hepatic fluid collection - s/p drainage


Hypokalemia - improving


Hypomagnesemia - resolved


Hypertension - restarted home meds





Potassium 3.2 this morning. BP normal this morning after restarting patients HTN

medications. 





Fluid in bag looks purulent, I already spoke with Dr. Catherine and they are going 

to send it for culture and sensitivity.  I told pt and his wife I would like to 

keep him at least one more day on IV ABX, his WBC did come down from yesterday; 

but still above normal.  


I also talked with them about possible causes of the subcapsular liver abscess; 

it could have been from GB stone, could have come from lung.  It's also possible

the liver abscess caused the pneumonia and pleural effusion.  He could even have

two different


things going on.  Hopefully the IV ABX will take care of it and we will focus 

treatment once we know the bacteria.  All questions answered to their 

satisfaction.





Supervisory-Addendum Brief


Verification & Attestation


Participated in pt care:  history, MDM, physical


Personally performed:  exam, history, MDM, supervision of care


Care discussed with:  Medical Student


Procedures:  n/a


Verification and Attestation of Medical Student E/M Service





A medical student performed and documented this service. I then reviewed and 

verified all information documented by the medical student and made 

modifications to such information, when appropriate. I personally performed a 

physical exam, medical decision making and then discussed any differences 

between the notes and made revisions as necessary to create one note.





Noemi Arita , 5/16/23 , 10:41











JUSTYNA BRYSON              May 16, 2023 07:41


NOEMI ARITA DO               May 16, 2023 10:39

## 2023-05-16 NOTE — DIAGNOSTIC IMAGING REPORT
INDICATION: 

RT abdominal fluid collection drainage. The patient presents for

CT-guided drain placement.



TECHNIQUE: 

All CT scans use one or more of the following dose optimizing

techniques: automated exposure control, MA and/or KvP adjustment

based on patient size and exam type or iterative reconstruction. 



DETAILS OF THE PROCEDURE:

The patient was brought to the CT suite and placed on the table

in the supine position. Axial imaging through the abdomen was

performed to evaluate for an appropriate entry site. The

procedure was performed utilizing conscious sedation with

Radiology nursing and constant patient monitoring. The patient

was given a total of 50 mcg of fentanyl intravenously. The total

procedure time was 4 minutes.



The right abdomen was prepped and draped in the usual sterile

fashion. A small amount of 1% lidocaine was utilized for local

anesthesia. The liver subcapsular fluid collection was accessed

using a Yueh needle. The Yueh was exchanged over an 035 Amplatz

guidewire. The tract was dilated with 6 and 8-Azerbaijani dilators.

Next, an 8.2-Azerbaijani all-purpose pigtail drain was placed over the

guidewire and formed in the subcapsular collection. The inner

stiffeners and wires were removed. Followup imaging shows good

placement. Purulent material was aspirated which will be sent to

the Lab for culture. The drain was affixed to the patient's skin

and placed to a Praveen drain. The patient tolerated the

procedure well and left the Department in stable condition.



IMPRESSION: 

Successful CT-guided liver subcapsular fluid collection drain

placement utilizing conscious sedation.



Dictated by: 



  Dictated on workstation # [id number]

## 2023-05-16 NOTE — PRE-OP NOTE & CONSCIOUS SEDAT
Pre-Operative Progress Note


Date of Available H&P:  May 16, 2023


Date H&P Reviewed:  May 16, 2023


Time H&P Reviewed:  08:00


Pre-Op Diagnosis:  abdominal fluid collection





Moderate Sedation PreProcedure


Time


08:00





ASA Score


2














Airway 


 


Lungs 


 


Heart 


 


 ASA score


 


 ASA 1: a normal healthy patient


 


 ASA 2:  a patient with a mild systemic disease (mid diabetes, controlled 

hypertension, obesity 


 


 ASA 3:  a patient with a severe systemic disease that limits activity  (angina,

COPD, prior Myocardial infarction)


 


 ASA 4:  a patient with an incapacitating disease that is a constant threat to 

life (CHF, renal failure)


 


 ASA 5:  a moribund patient not expected to survive 24 hrs.  (ruptured aneurysm)


 


 ASA 6:  a declared brain-dead patient whose organs are being harvested.


 


 For emergent operations, add the letter E after the classification











Mallampati Classification


Grade 2





Sedation Plan


Analgesia, Amnesia, Plan communicated to team members, Discussed options with 

patient/fam, Discussed risks with patient/fam


The patient is an appropriate candidate to undergo the planned procedure, 

sedation, and anesthesia.





The patient immediately re-assessed prior to indication.











LUCHO SHAFER MD             May 16, 2023 09:49

## 2023-05-16 NOTE — PROGRESS NOTE
Subjective


Subjective


Date Seen by Provider:  May 16, 2023


Time Seen by Provider:  08:20


Pt seen with wife and son in room today.  


He reports he had improved symptoms over the past 24 hours due to IV antibiotics

and fluids.


He did not eat much last night due to GI upset and decreased appetite.





Review of Systems


General:  No Chills; Fatigue, Malaise, Appetite (decreased)


HEENT:  No Head Aches, No Visual Changes, No Dysphasia, No Sore Throat


Pulmonary:  Dyspnea, Cough


Cardiovascular:  No: Chest Pain, Palpitations, Edema


Gastrointestinal:  Abdominal Pain; No: Nausea, Vomiting


Genitourinary:  No Dysuria, No Frequency


Musculoskeletal:  No: neck pain, shoulder pain


Neurological:  Weakness; No: Change in speech, Confusion





All Other Systems Reviewed


All Other Systems Reviewed:  Yes





Objective


Exam


Vital Signs





Vital Signs








  Date Time  Temp Pulse Resp B/P (MAP) Pulse Ox O2 Delivery O2 Flow Rate FiO2


 


5/16/23 19:18 37.1 73 18 149/73 (98) 91 Room Air  


 


5/16/23 16:51 37.4 73 18 146/76 (99) 91 Room Air  


 


5/16/23 13:03 36.5 70 20 134/65 (88) 92 Room Air  


 


5/16/23 12:10 36.6 68 20 132/64 (86) 93 Room Air  


 


5/16/23 11:13 36.6 75 20 138/64 (88) 93 Room Air  


 


5/16/23 10:00 36.4 68 20 146/65 (92) 91   


 


5/16/23 09:30  71 20 135/65 (88) 92 Room Air  


 


5/16/23 09:25  65 14 143/57 (85) 90 Room Air  


 


5/16/23 09:20  71 16 151/64 (93) 92 Room Air  


 


5/16/23 09:10  68 20 155/58 (90) 92 Room Air  


 


5/16/23 09:08      Room Air 0.00 


 


5/16/23 08:00     92 Room Air 0.00 


 


5/16/23 07:18 37.0 82 20 131/64 (86) 90 Room Air  


 


5/16/23 04:28 36.2 62 22 142/69 (93) 95 NIV CPAP 2.00 


 


5/15/23 23:27 37.5 81 18 152/68 (96) 92 Room Air  














I & O 


 


 5/16/23





 07:00


 


Intake Total 750 ml


 


Balance 750 ml








General Appearance:  No Apparent Distress, Chronically ill; No Mild Distress


Eyes:  Bilateral Eye Normal Inspection, Bilateral Eye PERRL, Bilateral Eye EOMI,

Bilateral Eye Abnormal EOM


HEENT:  PERRL/EOMI, Pharynx Normal


Neck:  Full Range of Motion, Normal Inspection, Non Tender, Supple


Respiratory:  Chest Non Tender, Lungs Clear, Normal Breath Sounds, No Accessory 

Muscle Use, No Respiratory Distress


Cardiovascular:  Regular Rate, Rhythm


Gastrointestinal:  Normal Bowel Sounds, Soft, Tenderness (RUQ)


Rectal:  Deferred


Back:  Normal Inspection, No Vertebral Tenderness


Extremity:  Normal Capillary Refill, Non Tender, No Calf Tenderness, No Pedal 

Edema; No Pedal Edema


Neurologic/Psychiatric:  Alert, Oriented x3, No Motor/Sensory Deficits, Normal M

ood/Affect


Skin:  Normal Color, Warm/Dry


Lymphatic:  No Adenopathy





Results


Lab


Laboratory Tests


5/16/23 05:20: 


White Blood Count 15.6H, Red Blood Count 2.94L, Hemoglobin 9.5L, Hematocrit 28L,

Mean Corpuscular Volume 95, Mean Corpuscular Hemoglobin 32, Mean Corpuscular 

Hemoglobin Concent 34, Red Cell Distribution Width 12.3, Platelet Count 429H, 

Mean Platelet Volume 8.5L, Immature Granulocyte % (Auto) 1, Neutrophils (%) 

(Auto) 87H, Lymphocytes (%) (Auto) 6L, Monocytes (%) (Auto) 6, Eosinophils (%) 

(Auto) 0, Basophils (%) (Auto) 0, Neutrophils # (Auto) 13.6H, Lymphocytes # 

(Auto) 0.9L, Monocytes # (Auto) 0.9, Eosinophils # (Auto) 0.0, Basophils # 

(Auto) 0.0, Immature Granulocyte # (Auto) 0.1, Sodium Level 134L, Potassium 

Level 3.2L, Chloride Level 100, Carbon Dioxide Level 24, Anion Gap 10, Blood 

Urea Nitrogen 12, Creatinine 0.92, Estimat Glomerular Filtration Rate 85, 

BUN/Creatinine Ratio 13, Glucose Level 111H, Calcium Level 8.6, Corrected 

Calcium 9.7, Total Bilirubin 0.9, Aspartate Amino Transf (AST/SGOT) 63H, Alanine

Aminotransferase (ALT/SGPT) 51, Alkaline Phosphatase 162H, Total Protein 5.9L, 

Albumin 2.6L


5/16/23 05:28: Magnesium Level 1.8





Microbiology


5/15/23 Blood Culture - Preliminary, Resulted


          No growth





Assessment/Plan


Assessment/Plan


Admission Dx


Liver Abscess


Hepatic fluid collection


Mild pneumonia


Pleural effusion


Leukocytosis


Chronic COPD


Hyponatremia


Hypokalemia


Dehydration


Weakness


Decreased appetite


Hypomagnesemia


Assessment and Plan


Liver Abscess


Hepatic fluid collection


Mild pneumonia


Pleural effusion


Leukocytosis


Chronic COPD


Hyponatremia


Hypokalemia


Dehydration


Weakness


Decreased appetite


Hypomagnesemia








Liver Abscess, Hepatic fluid collection


    -  CT guided liver abscess drainage - PURULENT fluid from drainage - sent 

for culture.   


    - Pt on iv Zosyn, 


   


Mild pneumonia with Pleural effusion


   with Leukocytosis - improved


   - pt on IV zosyn, repeat cxr tomorrow.





Chronic COPD


    - monitor symptoms





Hyponatremia, Hypokalemia, Hypomagnesemia due to Dehydration


    - replace with IV fluids and IV supplementation





Weakness with Decreased appetite


   - pending symptoms after drainage of his abscess, will have staff walk pt on 

syed.





dvt prophylaxis with scd's


gi prophylaxis with ppi and lactobacillus


Admission Dx


Liver Abscess


Hepatic fluid collection


Mild pneumonia


Pleural effusion


Leukocytosis


Chronic COPD


Hyponatremia


Hypokalemia


Dehydration


Weakness


Decreased appetite


Hypomagnesemia





Clinical Quality Measures


Admission Status


Admission Dx


Liver Abscess


Hepatic fluid collection


Mild pneumonia


Pleural effusion


Leukocytosis


Chronic COPD


Hyponatremia


Hypokalemia


Dehydration


Weakness


Decreased appetite


Hypomagnesemia











PADMINI RODRIGUEZ MD            May 16, 2023 20:19

## 2023-05-16 NOTE — CONSULTATION
History of Present Illness


History of Present Illness


Patient Consulted On(moreno/time)


5/15/23


 1830


Date Seen by Provider:  May 15, 2023


Time Seen by Provider:  18:30


Reason for Visit:  abdominal pain and leukocytosis


History of Present Illness


Pt is a 78 y/o male who was admitted to the hospital after presenting to the ER 

for abdominal pain, lack of appetite, and failure to thrive at home after having

his gallbladder removed at the end of April.  Pt's report is that he does not 

have pain, but he does admit to crying out in pain when he sits up from lying 

down or when he is jostled acutely in the car.


He has not been eating more than a few saltine crackers, or drinking a little 

bit of a protein drink.


He admits to having a severely low appetite, and wanting to sleep almost 

continuously.


In the ER he was found to have a white count of 18, and a fluid collection 

versus abscess of his liver with mild atelectasis versus pneumonia





Allergies and Home Medications


Allergies


Coded Allergies:  


     naproxen (Unverified  Allergy, Unknown, 1/13/20)





Patient Home Medication List


Home Medication List Reviewed:  Yes


Aspirin (Aspirin EC) 81 Mg Tablet.dr, 81 MG PO HS, (Reported)


   Entered as Reported by: VALERIE ZAMAN on 5/15/23 1553


   Last Action: Held


Atorvastatin Calcium (Atorvastatin Calcium) 10 Mg Tablet, 10 MG PO HS, 

(Reported)


   Entered as Reported by: VALERIE ZAMAN on 5/15/23 1553


   Last Action: Continued


B Complex with Vitamin C (Super B with Vit C) 1 Each Capsule, 1 EACH PO DAILY, 

(Reported)


   Entered as Reported by: VALERIE ZAMAN on 5/15/23 1553


   Last Action: Held


Cetirizine HCl (Zyrtec) 10 Mg Capsule, 10 MG PO HS, (Reported)


   Entered as Reported by: AMIE FELIX on 4/28/23 1039


   Last Action: Converted


Cholecalciferol (Vitamin D3) (Vitamin D3) 50 Mcg (2000 Unit) Tablet, 50 MCG PO 

DAILY, (Reported)


   Entered as Reported by: VALERIE ZAMAN on 5/15/23 1553


   Last Action: Held


Escitalopram Oxalate (Escitalopram Oxalate) 20 Mg Tablet, 20 MG PO HS, 

(Reported)


   Entered as Reported by: VALERIE ZAMAN on 5/15/23 1553


   Last Action: Converted


Lactobacillus Acidophilus (Florajen Acidophilus) 20 Billion Cell Capsule, 1 EACH

PO HS, (Reported)


   Entered as Reported by: VALERIE ZAMAN on 5/15/23 1553


   Last Action: Held


Losartan Potassium (Losartan Potassium) 50 Mg Tablet, 50 MG PO HS, (Reported)


   Entered as Reported by: VALERIE ZAMAN on 5/15/23 1553


   Last Action: Continued


Mirabegron (Myrbetriq) 50 Mg Tab.er.24h, 50 MG PO HS, (Reported)


   Entered as Reported by: AMIE FELIX on 4/28/23 1039


   Last Action: Converted


Multivitamin (Multivitamin) 1 Each Tablet, 1 EACH PO DAILY, (Reported)


   Entered as Reported by: VALERIE ZAMAN on 5/15/23 1553


   Last Action: Held


Omeprazole (Omeprazole) 20 Mg Capsule.dr, 20 MG PO DAILY, (Reported)


   Entered as Reported by: AMIE FELIX on 4/28/23 1039


   Last Action: Continued


Discontinued Medications


Aspirin ([place] Aspirin) 81 Mg Tablet.dr, 81 MG PO DAILY, (Reported)


   Discontinued Reason: Duplicate Order


   Entered as Reported by: BRITT SOTO on 4/9/13 1244


   Last Action: Discontinued


Atorvastatin Calcium (Lipitor) 10 Mg Tablet, 10 MG PO HS, (Reported)


   Discontinued Reason: Duplicate Order


   Entered as Reported by: AMIE FELIX on 4/28/23 1039


   Last Action: Discontinued


Buspirone HCl (Buspirone HCl) 5 Mg Tablet, 5 MG PO DAILY, (Reported)


   Discontinued Reason: Duplicate Order


   Entered as Reported by: AMIE FELIX on 4/28/23 1039


   Last Action: Discontinued


Cyanocobalamin (Vitamin B12) 1,000 Mcg Tablet.sa, 1,000 MCG PO, (Reported)


   Discontinued Reason: Duplicate Order


   Entered as Reported by: BRITT SOTO on 4/9/13 1244


   Last Action: Discontinued


Escitalopram Oxalate (Lexapro) 20 Mg Tablet, 20 MG PO DAILY, (Reported)


   Discontinued Reason: Duplicate Order


   Entered as Reported by: AMIE FELIX on 4/28/23 1039


   Last Action: Discontinued


Hydrocodone/Acetaminophen (Hydrocodone-Acetamin 5-325 mg) 5 Mg-325 Mg Tablet, 1 

TAB PO Q8H PRN for PAIN-MODERATE (5-7)


   Discontinued Reason: Duplicate Order


   Prescribed by: NOEMI HART on 4/28/23 1231


   Last Action: Discontinued


Ibuprofen (Advil) 200 Mg Capsule, 200 MG PO PRN, (Reported)


   Discontinued Reason: Duplicate Order


   Entered as Reported by: AMIE FELIX on 4/28/23 1039


   Last Action: Discontinued


Losartan/Hydrochlorothiazide (Losartan-Hctz 50-12.5 mg Tab) 50 Mg-12.5 Mg 

Tablet, 1 EACH PO DAILY, (Reported)


   Discontinued Reason: Duplicate Order


   Entered as Reported by: AMIE FELIX on 4/28/23 1039


   Last Action: Discontinued


Multivitamin (Multi-Vitamin Daily) 1 Each Tablet, 1 EACH PO, (Reported)


   Discontinued Reason: Duplicate Order


   Entered as Reported by: BRITT SOTO on 4/9/13 1244


   Last Action: Discontinued


Vitamin E Acid Succinate (Vitamin E) 100 Unit Tablet, 100 UNIT PO, (Reported)


   Discontinued Reason: Duplicate Order


   Entered as Reported by: BRITT SOTO on 4/9/13 1244


   Last Action: Discontinued





Past Medical-Social-Family Hx


Patient Social History


Marrital Status:  


Living Status:  lives at home with spouse in [place]


Employed/Student:  retired (/counselor)


Tobacco Use?:  No


Smoking Status:  Former Smoker


Use of E-Cig and/or Vaping dev:  No


Substance use?:  No


Alcohol Use?:  No


Pt feels they are or have been:  No





Immunizations Up To Date


First/Initial COVID19 Vaccinat:  X4


Second COVID19 Vaccination [place]:  X4





Seasonal Allergies


Seasonal Allergies:  Yes





Current Status


Advance Directives:  No


Communicates:  Verbally


Primary Language:  English


Preferred Spoken Language:  English


Is interpretation needed?:  No


Sensory deficits:  Hearing impairment


Implanted or Applied Medical D:  CPAP





Past Medical History


Surgeries:  Bladder Surgery, Gallbladder, Orthopedic


Sleep Apnea, COPD


Currently Using CPAP:  Yes


Hypertension


Sexually Transmitted Disease:  No


Kidney Stones


Gall Bladder Disease


Arthritis, Fractures


Cataract


Loss of Vision:  Denies


Hearing Impairment:  Hard of Hearing, Hearing Aide Right, Hearing Aide Left


ADD/ADHD, Anxiety, Depression


Blood Disorders:  No


Adverse Reaction/Blood Tranf:  No





Family Medical History


Reviewed Nursing Family Hx


Heart Disease (Father), Cancer (Grandmother), Hypertension (Father), Other 

Conditions/Hx (Mother had "mental problems")





Review of Systems


Review of Systems


General:  No Chills; Fatigue, Malaise, Appetite (decreased)


HEENT:  No Head Aches, No Visual Changes, No Dysphasia, No Sore Throat


Pulmonary:  Dyspnea, Cough


Cardiovascular:  No: Chest Pain, Palpitations, Edema


Gastrointestinal:  Abdominal Pain; No: Nausea, Vomiting


Genitourinary:  No Dysuria, No Frequency


Musculoskeletal:  No: neck pain, shoulder pain


Neurological:  Weakness; No: Change in speech, Confusion





All Other Systems Reviewed


All Other Systems Reviewed:  Yes





Physical Exam


Vital Signs





Vital Signs - First Documented








 5/15/23 5/16/23





 11:42 04:28


 


Temp 36.6 


 


Pulse 90 


 


Resp 16 


 


B/P (MAP) 137/71 (93) 


 


Pulse Ox 95 


 


O2 Delivery Room Air 


 


O2 Flow Rate  2.00





Capillary Refill : Less Than 3 Seconds


Height, Weight, BMI


Height: '"


Weight: lbs. oz. kg; 24.64 BMI


Method:


General Appearance:  No Apparent Distress, Chronically ill; No Mild Distress


Eyes:  Bilateral Eye Normal Inspection, Bilateral Eye PERRL, Bilateral Eye EOMI


HEENT:  PERRL/EOMI, Pharynx Normal


Neck:  Full Range of Motion, Normal Inspection, Non Tender, Supple


Respiratory:  Chest Non Tender, Lungs Clear, Normal Breath Sounds, No Accessory 

Muscle Use, No Respiratory Distress


Cardiovascular:  Regular Rate, Rhythm


Gastrointestinal:  Normal Bowel Sounds, Soft, Tenderness (RUQ)


Rectal:  Deferred


Back:  Normal Inspection, No Vertebral Tenderness


Extremity:  Normal Capillary Refill, Non Tender, No Calf Tenderness, No Pedal 

Edema; No Pedal Edema


Neurologic/Psychiatric:  Alert, Oriented x3, No Motor/Sensory Deficits, Normal 

Mood/Affect


Skin:  Normal Color, Warm/Dry


Lymphatic:  No Adenopathy





Assessment/Plan


Assessment/Plan


Admission Dx


Liver Abscess


Hepatic fluid collection


Mild pneumonia


Pleural effusion


Leukocytosis


Chronic COPD


Hyponatremia


Hypokalemia


Dehydration


Weakness


Decreased appetite


Hypomagnesemia


Admission Status:  Inpatient Order (span 2 midnights)


Reason for Inpatient Admission:  


inpatient admission for hepatic abscess and mild pneumonia with


leukocytosis - will require at least 48-72 hours for iv antibiotics, liver


drainage, culture report


Assessment and Plan


Liver Abscess


Hepatic fluid collection


Mild pneumonia


Pleural effusion


Leukocytosis


Chronic COPD


Hyponatremia


Hypokalemia


Dehydration


Weakness


Decreased appetite


Hypomagnesemia








Liver Abscess, Hepatic fluid collection


    - planning on CT guided liver abscess drainage   


    - Pt on iv Zosyn,  will have fluid sent for culture


   


Mild pneumonia with Pleural effusion


   with Leukocytosis


   - pt on IV zosyn, repeat cxr in the next 24 - 48 hours.





Chronic COPD


    - monitor symptoms





Hyponatremia, Hypokalemia, Hypomagnesemia due to Dehydration


    - replace with IV fluids and IV supplementation





Weakness with Decreased appetite


   - pending symptoms after drainage of his abscess, will have staff walk pt on 

syed.





dvt prophylaxis with scd's


gi prophylaxis with ppi and lactobacillus











PADMINI RODRIGUEZ MD            May 16, 2023 20:01 No

## 2023-05-17 VITALS — DIASTOLIC BLOOD PRESSURE: 67 MMHG | SYSTOLIC BLOOD PRESSURE: 128 MMHG

## 2023-05-17 VITALS — DIASTOLIC BLOOD PRESSURE: 70 MMHG | SYSTOLIC BLOOD PRESSURE: 158 MMHG

## 2023-05-17 VITALS — DIASTOLIC BLOOD PRESSURE: 72 MMHG | SYSTOLIC BLOOD PRESSURE: 143 MMHG

## 2023-05-17 VITALS — DIASTOLIC BLOOD PRESSURE: 71 MMHG | SYSTOLIC BLOOD PRESSURE: 135 MMHG

## 2023-05-17 VITALS — SYSTOLIC BLOOD PRESSURE: 125 MMHG | DIASTOLIC BLOOD PRESSURE: 78 MMHG

## 2023-05-17 VITALS — SYSTOLIC BLOOD PRESSURE: 132 MMHG | DIASTOLIC BLOOD PRESSURE: 70 MMHG

## 2023-05-17 LAB
ALBUMIN SERPL-MCNC: 2.4 GM/DL (ref 3.2–4.5)
ALP SERPL-CCNC: 131 U/L (ref 40–136)
ALT SERPL-CCNC: 52 U/L (ref 0–55)
BILIRUB SERPL-MCNC: 0.5 MG/DL (ref 0.1–1)
BUN/CREAT SERPL: 11
CALCIUM SERPL-MCNC: 8.2 MG/DL (ref 8.5–10.1)
CHLORIDE SERPL-SCNC: 101 MMOL/L (ref 98–107)
CO2 SERPL-SCNC: 24 MMOL/L (ref 21–32)
CREAT SERPL-MCNC: 0.79 MG/DL (ref 0.6–1.3)
GFR SERPLBLD BASED ON 1.73 SQ M-ARVRAT: 91 ML/MIN
GLUCOSE SERPL-MCNC: 104 MG/DL (ref 70–105)
HCT VFR BLD CALC: 26 % (ref 40–54)
HGB BLD-MCNC: 9 G/DL (ref 13.3–17.7)
MAGNESIUM SERPL-MCNC: 1.7 MG/DL (ref 1.6–2.4)
MCH RBC QN AUTO: 33 PG (ref 25–34)
MCHC RBC AUTO-ENTMCNC: 35 G/DL (ref 32–36)
MCV RBC AUTO: 95 FL (ref 80–99)
PLATELET # BLD: 398 10^3/UL (ref 130–400)
PMV BLD AUTO: 8.7 FL (ref 9–12.2)
POTASSIUM SERPL-SCNC: 3.1 MMOL/L (ref 3.6–5)
PROT SERPL-MCNC: 5.4 GM/DL (ref 6.4–8.2)
SODIUM SERPL-SCNC: 135 MMOL/L (ref 135–145)
WBC # BLD AUTO: 8.6 10^3/UL (ref 4.3–11)

## 2023-05-17 RX ADMIN — SODIUM CHLORIDE SCH MLS/HR: 900 INJECTION INTRAVENOUS at 06:10

## 2023-05-17 RX ADMIN — MIRABEGRON SCH MG: 25 TABLET, FILM COATED, EXTENDED RELEASE ORAL at 20:41

## 2023-05-17 RX ADMIN — POTASSIUM CHLORIDE SCH MEQ: 1500 TABLET, EXTENDED RELEASE ORAL at 06:10

## 2023-05-17 RX ADMIN — SODIUM CHLORIDE SCH MLS/HR: 900 INJECTION INTRAVENOUS at 12:21

## 2023-05-17 RX ADMIN — SODIUM CHLORIDE SCH MLS/HR: 900 INJECTION INTRAVENOUS at 20:41

## 2023-05-17 RX ADMIN — Medication SCH EACH: at 18:31

## 2023-05-17 RX ADMIN — LORATADINE SCH MG: 10 TABLET ORAL at 20:41

## 2023-05-17 RX ADMIN — HYDROCODONE BITARTRATE AND ACETAMINOPHEN PRN EA: 5; 325 TABLET ORAL at 20:45

## 2023-05-17 RX ADMIN — Medication SCH EACH: at 07:29

## 2023-05-17 RX ADMIN — LOSARTAN POTASSIUM SCH MG: 50 TABLET, FILM COATED ORAL at 20:41

## 2023-05-17 RX ADMIN — PANTOPRAZOLE SCH MG: 20 TABLET, DELAYED RELEASE ORAL at 09:28

## 2023-05-17 RX ADMIN — Medication SCH EACH: at 12:21

## 2023-05-17 RX ADMIN — ENOXAPARIN SODIUM SCH MG: 100 INJECTION SUBCUTANEOUS at 12:20

## 2023-05-17 RX ADMIN — SODIUM CHLORIDE, SODIUM LACTATE, POTASSIUM CHLORIDE, AND CALCIUM CHLORIDE SCH MLS/HR: 600; 310; 30; 20 INJECTION, SOLUTION INTRAVENOUS at 07:31

## 2023-05-17 RX ADMIN — ATORVASTATIN CALCIUM SCH MG: 10 TABLET, FILM COATED ORAL at 20:41

## 2023-05-17 NOTE — PROGRESS NOTE - SURGERY
VICKINDMIRNAHYACINTHJUSTYNA 5/17/23 0657:


Subjective


Date Seen by a Provider:  May 17, 2023


Time Seen by a Provider:  06:45


Subjective/Events-last exam


Patient states that he had a small BM this morning. States that he feels much 

better today than he has over the past week. Last night he was able to eat je

llo. Denies abdominal pain, nausea, vomiting, fever.


Review of Systems


General:  No Chills, No Night Sweats


HEENT:  No Head Aches, No Visual Changes


Pulmonary:  No Dyspnea, No Cough


Cardiovascular:  No: Chest Pain, Palpitations


Gastrointestinal:  No: Nausea, Vomiting, Abdominal Pain


Genitourinary:  No Dysuria, No Frequency


Musculoskeletal:  No: neck pain, shoulder pain


Neurological:  No: Change in speech, Confusion





Focused Exam


Lactate Level


5/15/23 13:53: Lactic Acid Level 0.92





Objective


Exam





Vital Signs








  Date Time  Temp Pulse Resp B/P (MAP) Pulse Ox O2 Delivery O2 Flow Rate FiO2


 


5/17/23 04:54 36.5 67 18 158/70 (99) 96 NIV CPAP 2.00 


 


5/17/23 00:46 36.6 73 18 143/72 (95) 97 NIV CPAP 2.00 


 


5/16/23 20:52     91 Room Air  


 


5/16/23 19:18 37.1 73 18 149/73 (98) 91 Room Air  


 


5/16/23 16:51 37.4 73 18 146/76 (99) 91 Room Air  


 


5/16/23 13:03 36.5 70 20 134/65 (88) 92 Room Air  


 


5/16/23 12:10 36.6 68 20 132/64 (86) 93 Room Air  


 


5/16/23 11:13 36.6 75 20 138/64 (88) 93 Room Air  


 


5/16/23 10:00 36.4 68 20 146/65 (92) 91   


 


5/16/23 09:30  71 20 135/65 (88) 92 Room Air  


 


5/16/23 09:25  65 14 143/57 (85) 90 Room Air  


 


5/16/23 09:20  71 16 151/64 (93) 92 Room Air  


 


5/16/23 09:10  68 20 155/58 (90) 92 Room Air  


 


5/16/23 09:08      Room Air 0.00 


 


5/16/23 08:00     92 Room Air 0.00 


 


5/16/23 07:18 37.0 82 20 131/64 (86) 90 Room Air  














I & O0 


 


 5/17/23





 07:00


 


Intake Total 1710 ml


 


Output Total 450 ml


 


Balance 1260 ml





Capillary Refill : Less Than 3 Seconds


General Appearance:  No Apparent Distress, Chronically ill; No Mild Distress


HEENT:  PERRL/EOMI


Neck:  Full Range of Motion, Normal Inspection, Non Tender, Supple


Respiratory:  Chest Non Tender, Lungs Clear, Normal Breath Sounds, No Accessory 

Muscle Use, No Respiratory Distress


Cardiovascular:  Regular Rate, Rhythm; No No Murmur


Peripheral Pulses:  3+ Radial Pulses (R), 3+ Radial Pulses (L)


Gastrointestinal:  non tender, soft, other (healing surgical wounds with no redn

ess, swelling, drainage.  Has drain coming out of right flank with tan purulent 

fluid in bag)


Extremity:  Non Tender, No Calf Tenderness, No Pedal Edema


Neurologic/Psychiatric:  Alert, Oriented x3


Skin:  Normal Color, Warm/Dry


Lymphatic:  No Adenopathy





Results


Lab


Laboratory Tests


5/17/23 05:00: 


White Blood Count 8.6, Red Blood Count 2.75L, Hemoglobin 9.0L, Hematocrit 26L, 

Mean Corpuscular Volume 95, Mean Corpuscular Hemoglobin 33, Mean Corpuscular 

Hemoglobin Concent 35, Red Cell Distribution Width 12.2, Platelet Count 398, 

Mean Platelet Volume 8.7L, Sodium Level 135, Potassium Level 3.1L, Chloride 

Level 101, Carbon Dioxide Level 24, Anion Gap 10, Blood Urea Nitrogen 9, 

Creatinine 0.79, Estimat Glomerular Filtration Rate 91, BUN/Creatinine Ratio 11,

Glucose Level 104, Calcium Level 8.2L, Corrected Calcium 9.5, Magnesium Level 

1.7, Total Bilirubin 0.5, Aspartate Amino Transf (AST/SGOT) 59H, Alanine 

Aminotransferase (ALT/SGPT) 52, Alkaline Phosphatase 131, Total Protein 5.4L, 

Albumin 2.4L





Microbiology


5/15/23 Blood Culture - Preliminary, Resulted


          No growth





Assessment/Plan


Hepatic fluid collection


pneumonia


Hypokalemia


Hypomagnesemia





Drain still in place however draining has slowed. magnesium stable at 1.7. WBC 

count has gone down significantly to 8.6.





NOEMI ARITA DO 5/18/23 1202:


Subjective


Time Seen by a Provider:  15:01


Subjective/Events-last exam


Pt seen and examined, states he still does not really have appetite.  He saw Dr. Lloyd who told him she wanted him to stay one more night.


Review of Systems


General:  Appetite (decreased)


Pulmonary:  No Dyspnea, No Cough


Cardiovascular:  No: Chest Pain, Palpitations


Gastrointestinal:  No: Nausea, Vomiting, Abdominal Pain





Objective


Exam


General Appearance:  No Apparent Distress, Chronically ill


HEENT:  PERRL/EOMI


Respiratory:  Chest Non Tender, Lungs Clear, No Accessory Muscle Use, No 

Respiratory Distress, Decreased Breath Sounds (right base)


Cardiovascular:  Regular Rate, Rhythm, No Murmur


Gastrointestinal:  non tender, soft, other (healing surgical wounds with no 

redness, swelling, drainage.  Has drain coming out of right flank with serous 

fluid in bag)


Extremity:  No Calf Tenderness, No Pedal Edema


Neurologic/Psychiatric:  Alert, Oriented x3





Assessment/Plan


Pt seen yesterday, note done today.





Hepatic fluid collection


pneumonia


Hypokalemia


Hypomagnesemia





Drain still in place however draining has slowed. magnesium stable at 1.7. WBC 

count has gone down significantly to 8.6.





Will treat with one more day of IV ABX, encourage PO intake and ambulation.





Supervisory-Addendum Brief


Verification & Attestation


Participated in pt care:  history, MDM, physical


Personally performed:  exam, history, MDM, supervision of care


Care discussed with:  Medical Student


Procedures:  n/a


Verification and Attestation of Medical Student E/M Service





A medical student performed and documented this service. I then reviewed and 

verified all information documented by the medical student and made 

modifications to such information, when appropriate. I personally performed a 

physical exam, medical decision making and then discussed any differences 

between the notes and made revisions as necessary to create one note.





Noemi Arita , 5/18/23 , 12:02











JUSTYNA BRYSON              May 17, 2023 06:57


NOEMI ARITA DO               May 18, 2023 12:02

## 2023-05-17 NOTE — DIAGNOSTIC IMAGING REPORT
EXAMINATION: Chest 2 view



HISTORY: Pneumonia



COMPARISON: 5/15/2023



FINDINGS: 



Heart size and pulmonary vasculature are normal. There is

slightly increased size of a small right pleural effusion with

right basilar atelectasis or consolidation. Minimal left basilar

atelectasis. No pneumothorax. Degenerative changes of the

thoracic spine. Osseous structures are otherwise intact.



IMPRESSION: 



1. Slightly increased size of a small right pleural effusion with

adjacent right basilar atelectasis or consolidation.



Dictated by: 



  Dictated on workstation # [id number]

## 2023-05-18 VITALS — DIASTOLIC BLOOD PRESSURE: 73 MMHG | SYSTOLIC BLOOD PRESSURE: 146 MMHG

## 2023-05-18 VITALS — DIASTOLIC BLOOD PRESSURE: 77 MMHG | SYSTOLIC BLOOD PRESSURE: 166 MMHG

## 2023-05-18 VITALS — SYSTOLIC BLOOD PRESSURE: 135 MMHG | DIASTOLIC BLOOD PRESSURE: 70 MMHG

## 2023-05-18 VITALS — SYSTOLIC BLOOD PRESSURE: 158 MMHG | DIASTOLIC BLOOD PRESSURE: 65 MMHG

## 2023-05-18 RX ADMIN — SODIUM CHLORIDE SCH MLS/HR: 900 INJECTION INTRAVENOUS at 13:44

## 2023-05-18 RX ADMIN — SODIUM CHLORIDE, SODIUM LACTATE, POTASSIUM CHLORIDE, AND CALCIUM CHLORIDE SCH MLS/HR: 600; 310; 30; 20 INJECTION, SOLUTION INTRAVENOUS at 00:34

## 2023-05-18 RX ADMIN — PANTOPRAZOLE SCH MG: 20 TABLET, DELAYED RELEASE ORAL at 08:51

## 2023-05-18 RX ADMIN — SODIUM CHLORIDE, SODIUM LACTATE, POTASSIUM CHLORIDE, AND CALCIUM CHLORIDE SCH MLS/HR: 600; 310; 30; 20 INJECTION, SOLUTION INTRAVENOUS at 13:44

## 2023-05-18 RX ADMIN — SODIUM CHLORIDE SCH MLS/HR: 900 INJECTION INTRAVENOUS at 05:37

## 2023-05-18 RX ADMIN — Medication SCH EACH: at 08:51

## 2023-05-18 RX ADMIN — Medication SCH EACH: at 13:50

## 2023-05-18 RX ADMIN — POTASSIUM CHLORIDE SCH MEQ: 1500 TABLET, EXTENDED RELEASE ORAL at 05:37

## 2023-05-18 RX ADMIN — ENOXAPARIN SODIUM SCH MG: 100 INJECTION SUBCUTANEOUS at 13:44

## 2023-05-18 NOTE — PROGRESS NOTE - SURGERY
JUSTYNA BRYSON 5/18/23 0648:


Subjective


Date Seen by a Provider:  May 18, 2023


Time Seen by a Provider:  06:40


Subjective/Events-last exam


Patient reports his appetite is coming back. He has been drinking protein shakes

and ate a hamburger yesterday. Patient also states he feels like his energy is 

coming back. Denies nausea, vomiting or abdominal pain.


Review of Systems


General:  No Chills, No Night Sweats


HEENT:  No Head Aches, No Visual Changes


Pulmonary:  No Dyspnea, No Cough


Cardiovascular:  No: Chest Pain, Palpitations


Gastrointestinal:  No: Nausea, Vomiting, Abdominal Pain, Diarrhea, Constipation


Genitourinary:  No Dysuria, No Frequency


Musculoskeletal:  No: neck pain, shoulder pain


Neurological:  No: Change in speech, Confusion





Focused Exam


Lactate Level


5/15/23 13:53: Lactic Acid Level 0.92





Objective


Exam





Vital Signs








  Date Time  Temp Pulse Resp B/P (MAP) Pulse Ox O2 Delivery O2 Flow Rate FiO2


 


5/18/23 03:06 36.5 63 18 158/65 (96) 96 NIV CPAP  


 


5/17/23 23:23 36.3 64 20 135/71 (92) 96 NIV CPAP  


 


5/17/23 20:45      Room Air  


 


5/17/23 20:00 37.0 76 18 132/70 (90) 94 Room Air  


 


5/17/23 16:21 36.8 68 17 125/78 (94) 96 Room Air  


 


5/17/23 13:21      Room Air 0.00 


 


5/17/23 11:09 36.5 69 18 128/67 (87) 96 Room Air  


 


5/17/23 08:00     96 Room Air 0.00 


 


5/17/23 07:25 36.6 67 18 158/70 (99) 94 Room Air  














I & O 


 


 5/18/23





 07:00


 


Intake Total 3920 ml


 


Output Total 35 ml


 


Balance 3885 ml





Capillary Refill : Less Than 3 Seconds


General Appearance:  No Apparent Distress, Chronically ill; No Mild Distress


HEENT:  PERRL/EOMI


Neck:  Full Range of Motion, Non Tender


Respiratory:  Chest Non Tender, Lungs Clear, Normal Breath Sounds, No Accessory 

Muscle Use, No Respiratory Distress


Cardiovascular:  Regular Rate, Rhythm; No No Murmur


Peripheral Pulses:  3+ Radial Pulses (R), 3+ Radial Pulses (L)


Gastrointestinal:  non tender, soft, other (healing surgical wounds with no 

redness, swelling, drainage.  Has drain coming out of right flank with tan 

purulent fluid in bag)


Extremity:  Non Tender, No Calf Tenderness, No Pedal Edema


Neurologic/Psychiatric:  Alert, Oriented x3


Skin:  Normal Color, Warm/Dry


Lymphatic:  No Adenopathy





Results


Lab





Microbiology


5/16/23 Anaerobic Culture - Preliminary, Resulted


          


5/15/23 Blood Culture - Preliminary, Resulted


          No growth





Assessment/Plan


Hepatic fluid collection


pneumonia


Hypokalemia


Hypomagnesemia





Draining present in right flank with tan purulent fluid.





RALPH ARITA DO 5/18/23 1212:


Subjective


Time Seen by a Provider:  11:38


Subjective/Events-last exam


Pt seen and examined, he is having BMs and eating a little more.  He wants to go

home.


Review of Systems


General:  Malaise


Pulmonary:  No Dyspnea, No Cough


Cardiovascular:  No: Chest Pain, Palpitations


Gastrointestinal:  No: Nausea, Vomiting, Abdominal Pain


Genitourinary:  No Dysuria, No Frequency





Objective


Exam


General Appearance:  No Apparent Distress, Chronically ill


HEENT:  PERRL/EOMI


Respiratory:  Chest Non Tender, Lungs Clear, No Accessory Muscle Use, No 

Respiratory Distress, Decreased Breath Sounds (right base)


Cardiovascular:  Regular Rate, Rhythm, No Murmur


Gastrointestinal:  non tender, soft, other (healing surgical wounds with no 

redness, swelling, drainage.  Has drain coming out of right flank with serous 

fluid in drain tube)


Extremity:  Non Tender, No Calf Tenderness


Neurologic/Psychiatric:  Alert, Oriented x3


Skin:  Normal Color, Warm/Dry





Assessment/Plan


Hepatic fluid Abscess - growing Gram Negative Bacilli


?Bacteremia - but onl in one bottle (probably not significant)


pneumonia


Hypokalemia


Hypomagnesemia - resolved





Draining present in right flank with serous fluid. 





Will DC pt home on Augmentin, to follow up in office on Tuesday and have drain 

removed.  He had no questions.





Supervisory-Addendum Brief


Verification & Attestation


Participated in pt care:  history, MDM, physical


Personally performed:  exam, history, MDM, supervision of care


Care discussed with:  Medical Student


Procedures:  n/a


Verification and Attestation of Medical Student E/M Service





A medical student performed and documented this service. I then reviewed and 

verified all information documented by the medical student and made 

modifications to such information, when appropriate. I personally performed a 

physical exam, medical decision making and then discussed any differences 

between the notes and made revisions as necessary to create one note.





Ralph Arita , 5/18/23 , 12:11











JUSTYNA BRYSON              May 18, 2023 06:48


RALPH ARITA DO               May 18, 2023 12:12

## 2023-05-18 NOTE — DISCHARGE INST-SURGICAL
Discharge Inst-Surgical


Depart Medication/Instructions


New, Converted or Re-Newed RX:  Transmitted to Pharmacy


Patient Instructions


Follow up Appt:


Make appointment for 1 week. [phone]





Instructions:


No lifting greater than 20 pounds.


No strenuous activity. 


May shower in 24 hours, no tub bath or soaking.


Use incentive spirometer at home as directed.


No Smoking





Skin/Wound Care:


May remove bandages in am.  You need to leave the drain in place and come to the

office to have it removed. 





Symptoms to Report:


Appetite Changes, Extremity Discoloration, Numbness/Tingling, Swelling 

Increased, Bleeding Excessive, Eyesight Changes, Pain Increased, Urine Color 

Change, Constipation(Persistent), Fever over 101 degree F, Pain/Pressure in 

chest, Urinating Difficulty, Cough Up/Vomit Blood, Heart Beat Irreg/Pounding, 

Pain/Pressure in jaw, Cramps in feet or legs, Lightheadedness, Pain/Pressure in 

shoulder, Diarrhea(Persistent), Memory Changes Suddenly, Questions/Concerns, 

Weight gain consecutive days, Dizziness/Fainting, Nausea/Vomiting, Shortness of 

Breath, Weight gain over 2 pounds








If questions or concerns contact your physician 


Or seek help at emergency department.





Activity


Activity as Tolerated:  Yes


Activity Instructions:  Avoid Stress to Incision


Driving Instructions:  No Driving/Refer to Dr. Burkett


Discharge Diet:  No Restrictions


Diet After 24 Hours:  Clear Liquid if Nauseous


If Any Problems/Questions/Issu:  Contact Your Physician, Go to Emergency Room





Skin/Wound Care


Infection Signs and Symptoms:  Increased Redness, Foul Odor of Wound, Increased 

Drainage, Skin Itchy or Has a Rash, Increased Swelling, Temperature Above 101  F


Bathing Instructions:  NOEMI Adrian DO               May 18, 2023 12:13

## 2023-05-18 NOTE — PROGRESS NOTE
Subjective


Review of Systems


General:  No Chills, No Night Sweats; Fatigue, Appetite (decreased)


HEENT:  No Head Aches, No Visual Changes


Pulmonary:  No Dyspnea, No Cough


Cardiovascular:  No: Chest Pain, Palpitations


Gastrointestinal:  No: Nausea, Vomiting, Abdominal Pain, Diarrhea, Constipation


Genitourinary:  No Dysuria, No Frequency


Musculoskeletal:  No: neck pain, shoulder pain


Neurological:  No: Change in speech, Confusion





All Other Systems Reviewed


All Other Systems Reviewed:  Yes





Objective


Exam


Vital Signs





Vital Signs








  Date Time  Temp Pulse Resp B/P (MAP) Pulse Ox O2 Delivery O2 Flow Rate FiO2


 


5/18/23 07:52 36.0 75 18 146/73 (97) 93 Room Air  


 


5/18/23 03:06 36.5 63 18 158/65 (96) 96 NIV CPAP  


 


5/17/23 23:23 36.3 64 20 135/71 (92) 96 NIV CPAP  


 


5/17/23 20:45      Room Air  


 


5/17/23 20:00 37.0 76 18 132/70 (90) 94 Room Air  


 


5/17/23 16:21 36.8 68 17 125/78 (94) 96 Room Air  


 


5/17/23 13:21      Room Air 0.00 


 


5/17/23 11:09 36.5 69 18 128/67 (87) 96 Room Air  














I & O 


 


 5/18/23





 06:59


 


Intake Total 3920 ml


 


Output Total 35 ml


 


Balance 3885 ml








General Appearance:  No Apparent Distress, Chronically ill; No Mild Distress


Eyes:  Bilateral Eye Normal Inspection, Bilateral Eye PERRL, Bilateral Eye EOMI,

Bilateral Eye Abnormal EOM


HEENT:  PERRL/EOMI


Neck:  Full Range of Motion, Non Tender


Respiratory:  Chest Non Tender, Lungs Clear, Normal Breath Sounds, No Accessory 

Muscle Use, No Respiratory Distress


Cardiovascular:  Regular Rate, Rhythm; No No Murmur


Gastrointestinal:  Normal Bowel Sounds, Soft, Tenderness (RUQ - drainin place 

with biliary drainage with debris in tubing)


Rectal:  Deferred


Back:  Normal Inspection, No Vertebral Tenderness


Extremity:  Non Tender, No Calf Tenderness, No Pedal Edema


Neurologic/Psychiatric:  Alert, Oriented x3


Skin:  Normal Color, Warm/Dry


Lymphatic:  No Adenopathy





Results


Lab





Microbiology


5/16/23 Anaerobic Culture - Preliminary, Resulted


          


5/15/23 Blood Culture - Preliminary, Resulted


          No growth





Assessment/Plan


Assessment/Plan


Admission Dx


Liver Abscess


Hepatic fluid collection


Mild pneumonia


Pleural effusion


Leukocytosis


Chronic COPD


Hyponatremia


Hypokalemia


Dehydration


Weakness


Decreased appetite


Hypomagnesemia


Assessment and Plan


Liver Abscess


Hepatic fluid collection


Mild pneumonia


Pleural effusion


Leukocytosis


Chronic COPD


Hyponatremia


Hypokalemia


Dehydration


Weakness


Decreased appetite


Hypomagnesemia








Liver Abscess, Hepatic fluid collection


    -  CT guided liver abscess drainage - PURULENT fluid from drainage - sent 

for culture.   


    - Pt on iv Zosyn, 


   


Mild pneumonia with Pleural effusion


   with Leukocytosis - improved


   - pt on IV zosyn, repeat cxr showed some pleural effusion, no further 

infiltrate noted on cxr today.





Chronic COPD


    - monitor symptoms





Hyponatremia, Hypokalemia, Hypomagnesemia due to Dehydration


    - replace with IV fluids and IV supplementation





Weakness with Decreased appetite


   - slightly improved symptoms after drainage of his abscess, will have staff 

walk pt on syed.





dvt prophylaxis with scd's


gi prophylaxis with ppi and lactobacillus


Admission Dx


Liver Abscess


Hepatic fluid collection


Mild pneumonia


Pleural effusion


Leukocytosis


Chronic COPD


Hyponatremia


Hypokalemia


Dehydration


Weakness


Decreased appetite


Hypomagnesemia





Clinical Quality Measures


Admission Status


Admission Dx


Liver Abscess


Hepatic fluid collection


Mild pneumonia


Pleural effusion


Leukocytosis


Chronic COPD


Hyponatremia


Hypokalemia


Dehydration


Weakness


Decreased appetite


Hypomagnesemia











PADMINI RODRIGUEZ MD            May 18, 2023 08:30

## 2023-05-18 NOTE — PROGRESS NOTE
Subjective


Subjective


Date Seen by Provider:  May 17, 2023


Time Seen by Provider:  08:45


Pt seen with son in room today.  


He reports he has improved symptoms after drain was placed


He still does not have much of an appetite





Review of Systems


General:  No Chills, No Night Sweats; Fatigue, Appetite (decreased)


HEENT:  No Head Aches, No Visual Changes


Pulmonary:  No Dyspnea, No Cough


Cardiovascular:  No: Chest Pain, Palpitations


Gastrointestinal:  No: Nausea, Vomiting, Abdominal Pain, Diarrhea, Constipation


Genitourinary:  No Dysuria, No Frequency


Musculoskeletal:  No: neck pain, shoulder pain


Neurological:  No: Change in speech, Confusion





All Other Systems Reviewed


All Other Systems Reviewed:  Yes





Objective


Exam


Vital Signs





Vital Signs








  Date Time  Temp Pulse Resp B/P (MAP) Pulse Ox O2 Delivery O2 Flow Rate FiO2


 


5/18/23 07:52 36.0 75 18 146/73 (97) 93 Room Air  


 


5/18/23 03:06 36.5 63 18 158/65 (96) 96 NIV CPAP  


 


5/17/23 23:23 36.3 64 20 135/71 (92) 96 NIV CPAP  


 


5/17/23 20:45      Room Air  


 


5/17/23 20:00 37.0 76 18 132/70 (90) 94 Room Air  


 


5/17/23 16:21 36.8 68 17 125/78 (94) 96 Room Air  


 


5/17/23 13:21      Room Air 0.00 


 


5/17/23 11:09 36.5 69 18 128/67 (87) 96 Room Air  














I & O 


 


 5/18/23





 06:59


 


Intake Total 3920 ml


 


Output Total 35 ml


 


Balance 3885 ml








General Appearance:  No Apparent Distress, Chronically ill; No Mild Distress


Eyes:  Bilateral Eye Normal Inspection, Bilateral Eye PERRL, Bilateral Eye EOMI,

Bilateral Eye Abnormal EOM


HEENT:  PERRL/EOMI


Neck:  Full Range of Motion, Non Tender


Respiratory:  Chest Non Tender, Lungs Clear, Normal Breath Sounds, No Accessory 

Muscle Use, No Respiratory Distress


Cardiovascular:  Regular Rate, Rhythm; No No Murmur


Gastrointestinal:  Normal Bowel Sounds, Soft, Tenderness (RUQ - drainin place 

with biliary drainage with debris in tubing)


Rectal:  Deferred


Back:  Normal Inspection, No Vertebral Tenderness


Extremity:  Non Tender, No Calf Tenderness, No Pedal Edema


Neurologic/Psychiatric:  Alert, Oriented x3


Skin:  Normal Color, Warm/Dry


Lymphatic:  No Adenopathy





Results


Lab





Microbiology


5/16/23 Anaerobic Culture - Preliminary, Resulted


          


5/15/23 Blood Culture - Preliminary, Resulted


          No growth





Assessment/Plan


Assessment/Plan


Admission Dx


Liver Abscess


Hepatic fluid collection


Mild pneumonia


Pleural effusion


Leukocytosis


Chronic COPD


Hyponatremia


Hypokalemia


Dehydration


Weakness


Decreased appetite


Hypomagnesemia


Assessment and Plan


Liver Abscess


Hepatic fluid collection


Mild pneumonia


Pleural effusion


Leukocytosis


Chronic COPD


Hyponatremia


Hypokalemia


Dehydration


Weakness


Decreased appetite


Hypomagnesemia








Liver Abscess, Hepatic fluid collection


    -  CT guided liver abscess drainage - PURULENT fluid from drainage - sent 

for culture.   


    - Pt on iv Zosyn, 


   


Mild pneumonia with Pleural effusion


   with Leukocytosis - improved


   - pt on IV zosyn, repeat cxr showed some pleural effusion, no further 

infiltrate noted on cxr today.





Chronic COPD


    - monitor symptoms





Hyponatremia, Hypokalemia, Hypomagnesemia due to Dehydration


    - replace with IV fluids and IV supplementation





Weakness with Decreased appetite


   - slightly improved symptoms after drainage of his abscess, will have staff 

walk pt on syed.





dvt prophylaxis with scd's


gi prophylaxis with ppi and lactobacillus


Admission Dx


Liver Abscess


Hepatic fluid collection


Mild pneumonia


Pleural effusion


Leukocytosis


Chronic COPD


Hyponatremia


Hypokalemia


Dehydration


Weakness


Decreased appetite


Hypomagnesemia





Clinical Quality Measures


Admission Status


Admission Dx


Liver Abscess


Hepatic fluid collection


Mild pneumonia


Pleural effusion


Leukocytosis


Chronic COPD


Hyponatremia


Hypokalemia


Dehydration


Weakness


Decreased appetite


Hypomagnesemia











PADMINI RODRIGUEZ MD            May 18, 2023 08:29

## 2023-09-06 ENCOUNTER — HOSPITAL ENCOUNTER (OUTPATIENT)
Dept: HOSPITAL 75 - RAD FS | Age: 78
End: 2023-09-06
Attending: UROLOGY
Payer: MEDICARE

## 2023-09-06 DIAGNOSIS — N20.0: Primary | ICD-10-CM

## 2023-09-06 PROCEDURE — 74018 RADEX ABDOMEN 1 VIEW: CPT

## 2023-09-06 NOTE — DIAGNOSTIC IMAGING REPORT
ABDOMEN (KUB) 1 VIEW



INDICATION: Kidney stone



COMPARISON: CT abdomen and pelvis of 05/15/2023



TECHNIQUE: Supine AP view of the abdomen



FINDINGS: No radiographically apparent renal or ureteral stones.

Cholecystectomy clips are noted. Calcification in the liver is

stable. Fiducial markers within the prostate are again noted.

Stable degenerative changes of the hips and lumbar spine.



IMPRESSION: No radiographically apparent urinary tract calculi.



Dictated by: 



  Dictated on workstation # [id number]

## 2023-10-25 ENCOUNTER — HOSPITAL ENCOUNTER (OUTPATIENT)
Dept: HOSPITAL 75 - SLEEP | Age: 78
End: 2023-10-25
Attending: OTOLARYNGOLOGY
Payer: MEDICARE

## 2023-10-25 DIAGNOSIS — G47.33: Primary | ICD-10-CM

## 2023-10-25 DIAGNOSIS — G47.31: ICD-10-CM
